# Patient Record
Sex: FEMALE | Race: WHITE | NOT HISPANIC OR LATINO
[De-identification: names, ages, dates, MRNs, and addresses within clinical notes are randomized per-mention and may not be internally consistent; named-entity substitution may affect disease eponyms.]

---

## 2017-11-17 ENCOUNTER — TRANSCRIPTION ENCOUNTER (OUTPATIENT)
Age: 22
End: 2017-11-17

## 2017-12-30 ENCOUNTER — TRANSCRIPTION ENCOUNTER (OUTPATIENT)
Age: 22
End: 2017-12-30

## 2018-03-05 ENCOUNTER — APPOINTMENT (OUTPATIENT)
Dept: RHEUMATOLOGY | Facility: CLINIC | Age: 23
End: 2018-03-05

## 2018-03-06 ENCOUNTER — TRANSCRIPTION ENCOUNTER (OUTPATIENT)
Age: 23
End: 2018-03-06

## 2018-04-11 ENCOUNTER — TRANSCRIPTION ENCOUNTER (OUTPATIENT)
Age: 23
End: 2018-04-11

## 2018-07-05 ENCOUNTER — TRANSCRIPTION ENCOUNTER (OUTPATIENT)
Age: 23
End: 2018-07-05

## 2018-07-13 ENCOUNTER — APPOINTMENT (OUTPATIENT)
Dept: FAMILY MEDICINE | Facility: CLINIC | Age: 23
End: 2018-07-13
Payer: COMMERCIAL

## 2018-07-13 ENCOUNTER — NON-APPOINTMENT (OUTPATIENT)
Age: 23
End: 2018-07-13

## 2018-07-13 VITALS
DIASTOLIC BLOOD PRESSURE: 78 MMHG | TEMPERATURE: 98 F | HEART RATE: 88 BPM | RESPIRATION RATE: 14 BRPM | BODY MASS INDEX: 24.14 KG/M2 | SYSTOLIC BLOOD PRESSURE: 120 MMHG | WEIGHT: 115 LBS | OXYGEN SATURATION: 98 % | HEIGHT: 58 IN

## 2018-07-13 DIAGNOSIS — Z84.1 FAMILY HISTORY OF DISORDERS OF KIDNEY AND URETER: ICD-10-CM

## 2018-07-13 PROCEDURE — 99385 PREV VISIT NEW AGE 18-39: CPT | Mod: 25

## 2018-07-13 PROCEDURE — 86580 TB INTRADERMAL TEST: CPT

## 2018-07-13 PROCEDURE — 93000 ELECTROCARDIOGRAM COMPLETE: CPT

## 2018-08-16 ENCOUNTER — APPOINTMENT (OUTPATIENT)
Dept: OPHTHALMOLOGY | Facility: CLINIC | Age: 23
End: 2018-08-16
Payer: COMMERCIAL

## 2018-08-16 PROCEDURE — 92060 SENSORIMOTOR EXAMINATION: CPT

## 2018-08-16 PROCEDURE — 92014 COMPRE OPH EXAM EST PT 1/>: CPT

## 2018-08-16 PROCEDURE — 92083 EXTENDED VISUAL FIELD XM: CPT

## 2018-12-01 ENCOUNTER — TRANSCRIPTION ENCOUNTER (OUTPATIENT)
Age: 23
End: 2018-12-01

## 2019-01-03 ENCOUNTER — APPOINTMENT (OUTPATIENT)
Dept: FAMILY MEDICINE | Facility: CLINIC | Age: 24
End: 2019-01-03

## 2019-04-22 ENCOUNTER — APPOINTMENT (OUTPATIENT)
Dept: FAMILY MEDICINE | Facility: CLINIC | Age: 24
End: 2019-04-22

## 2019-05-09 ENCOUNTER — APPOINTMENT (OUTPATIENT)
Dept: PHYSICAL MEDICINE AND REHAB | Facility: CLINIC | Age: 24
End: 2019-05-09
Payer: COMMERCIAL

## 2019-05-09 VITALS
OXYGEN SATURATION: 100 % | SYSTOLIC BLOOD PRESSURE: 154 MMHG | TEMPERATURE: 98 F | HEART RATE: 81 BPM | DIASTOLIC BLOOD PRESSURE: 93 MMHG

## 2019-05-09 PROCEDURE — 99204 OFFICE O/P NEW MOD 45 MIN: CPT

## 2019-05-15 NOTE — PHYSICAL EXAM
[FreeTextEntry1] : Constitutional: Alert, awake, no acute distress\par HEENT: EOMI, NC/AT, neck supple\par Cardiovascular: All extremities warm and well perfused\par Pulmonary: No respiratory distress, normal chest wall expansion\par Gastrointestinal: No abdominal distention\par \par Neuro: \par AAOx3, somewhat anxious\par \par Recall 4/5 spontaneously., 5/5 with cues \par Attention--able to complete MOYB, reverse digit span and serial 7's\par Executive function--difficulty with Trails B and clock drawing. \par \par CN: intact no nystagmus, visual fields intact, PERRLA, EOMI, no facial weakness or sensory deficit, shoulder georgina intact, tongue midline\par VOR testing--no nystagmus\par near far accommodation--tolerates while wearing prisms\par focusing on stationary thumb with head rotation in horizontal or vertical plane--tolerates while wearing prisms\par Motor 5/5 bilat UE and LE\par Sens intact bilat UE and LE\par Coordination intact--FNF and HTS intact\par Gait normal\par Balance: able to perform tandem gait \par \par \par \par \par

## 2019-05-15 NOTE — ASSESSMENT
[FreeTextEntry1] : Pt. and her mother counselled at length on symptoms and pathology of concussion, recovery course and  prognosis. \par At this time, 9 months from concussion it is unlikely that pt's migraines and current cognitive deficits related to prior concussion, especially since migraines developed recently.  Pt. with insomnia which can cause cognitive symptoms and irritability.  Counselled on proper sleep hygiene techniques and recommended trial of melatonin. \par \par Recommended neurology evaluation for migraine evaluation--referral given.  \par \par Also recommend considering endocrine workup with pt's endocrinologist as hormonal changes can cause migraines and mood changes. \par \par Pt's visual deficits are a direct result of the concussion but these appear to be improving significantly with vision therapy and prism glasses.  Pt. advised to cont. to f/u with neurooptometry.\par \par All questions answered.\par \par \par

## 2019-05-15 NOTE — REVIEW OF SYSTEMS
[Fatigue] : fatigue [Vision Problems] : vision problems [Headache] : headache [Insomnia] : insomnia [Negative] : Heme/Lymph
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
## 2019-05-21 ENCOUNTER — TRANSCRIPTION ENCOUNTER (OUTPATIENT)
Age: 24
End: 2019-05-21

## 2019-05-23 ENCOUNTER — APPOINTMENT (OUTPATIENT)
Dept: ORTHOPEDIC SURGERY | Facility: CLINIC | Age: 24
End: 2019-05-23
Payer: COMMERCIAL

## 2019-05-23 VITALS
SYSTOLIC BLOOD PRESSURE: 135 MMHG | WEIGHT: 120 LBS | HEART RATE: 82 BPM | DIASTOLIC BLOOD PRESSURE: 88 MMHG | HEIGHT: 58 IN | BODY MASS INDEX: 25.19 KG/M2

## 2019-05-23 PROCEDURE — 73502 X-RAY EXAM HIP UNI 2-3 VIEWS: CPT | Mod: LT

## 2019-05-23 PROCEDURE — 99204 OFFICE O/P NEW MOD 45 MIN: CPT

## 2019-05-24 ENCOUNTER — APPOINTMENT (OUTPATIENT)
Dept: MRI IMAGING | Facility: HOSPITAL | Age: 24
End: 2019-05-24
Payer: COMMERCIAL

## 2019-05-24 ENCOUNTER — OUTPATIENT (OUTPATIENT)
Dept: OUTPATIENT SERVICES | Facility: HOSPITAL | Age: 24
LOS: 1 days | End: 2019-05-24
Payer: COMMERCIAL

## 2019-05-24 DIAGNOSIS — M25.552 PAIN IN LEFT HIP: ICD-10-CM

## 2019-05-24 PROCEDURE — 73721 MRI JNT OF LWR EXTRE W/O DYE: CPT

## 2019-05-24 PROCEDURE — 73721 MRI JNT OF LWR EXTRE W/O DYE: CPT | Mod: 26,LT

## 2019-05-25 ENCOUNTER — APPOINTMENT (OUTPATIENT)
Dept: MRI IMAGING | Facility: HOSPITAL | Age: 24
End: 2019-05-25
Payer: COMMERCIAL

## 2019-05-30 ENCOUNTER — APPOINTMENT (OUTPATIENT)
Dept: ORTHOPEDIC SURGERY | Facility: CLINIC | Age: 24
End: 2019-05-30
Payer: COMMERCIAL

## 2019-05-30 VITALS
DIASTOLIC BLOOD PRESSURE: 93 MMHG | SYSTOLIC BLOOD PRESSURE: 147 MMHG | WEIGHT: 120 LBS | HEIGHT: 58 IN | HEART RATE: 98 BPM | BODY MASS INDEX: 25.19 KG/M2

## 2019-05-30 PROCEDURE — 99213 OFFICE O/P EST LOW 20 MIN: CPT

## 2019-06-03 ENCOUNTER — APPOINTMENT (OUTPATIENT)
Dept: FAMILY MEDICINE | Facility: CLINIC | Age: 24
End: 2019-06-03
Payer: COMMERCIAL

## 2019-06-03 VITALS — SYSTOLIC BLOOD PRESSURE: 122 MMHG | TEMPERATURE: 98.3 F | DIASTOLIC BLOOD PRESSURE: 80 MMHG

## 2019-06-03 VITALS — HEART RATE: 77 BPM

## 2019-06-03 PROCEDURE — 36415 COLL VENOUS BLD VENIPUNCTURE: CPT

## 2019-06-03 PROCEDURE — 99213 OFFICE O/P EST LOW 20 MIN: CPT | Mod: 25

## 2019-06-04 LAB
ANION GAP SERPL CALC-SCNC: 11 MMOL/L
BASOPHILS # BLD AUTO: 0.07 K/UL
BASOPHILS NFR BLD AUTO: 0.8 %
BUN SERPL-MCNC: 8 MG/DL
CALCIUM SERPL-MCNC: 9.7 MG/DL
CHLORIDE SERPL-SCNC: 102 MMOL/L
CO2 SERPL-SCNC: 26 MMOL/L
CREAT SERPL-MCNC: 0.79 MG/DL
EOSINOPHIL # BLD AUTO: 0.1 K/UL
EOSINOPHIL NFR BLD AUTO: 1.2 %
GLUCOSE SERPL-MCNC: 102 MG/DL
HCT VFR BLD CALC: 42.7 %
HGB BLD-MCNC: 13.8 G/DL
IMM GRANULOCYTES NFR BLD AUTO: 0.2 %
LYMPHOCYTES # BLD AUTO: 2.21 K/UL
LYMPHOCYTES NFR BLD AUTO: 25.8 %
MAGNESIUM SERPL-MCNC: 2 MG/DL
MAN DIFF?: NORMAL
MCHC RBC-ENTMCNC: 27.5 PG
MCHC RBC-ENTMCNC: 32.3 GM/DL
MCV RBC AUTO: 85.2 FL
MONOCYTES # BLD AUTO: 0.69 K/UL
MONOCYTES NFR BLD AUTO: 8.1 %
NEUTROPHILS # BLD AUTO: 5.48 K/UL
NEUTROPHILS NFR BLD AUTO: 63.9 %
PHOSPHATE SERPL-MCNC: 3.6 MG/DL
PLATELET # BLD AUTO: 352 K/UL
POTASSIUM SERPL-SCNC: 4.8 MMOL/L
RBC # BLD: 5.01 M/UL
RBC # FLD: 13.3 %
SODIUM SERPL-SCNC: 139 MMOL/L
T3 SERPL-MCNC: 117 NG/DL
T4 SERPL-MCNC: 8.5 UG/DL
TSH SERPL-ACNC: 1.67 UIU/ML
WBC # FLD AUTO: 8.57 K/UL

## 2019-06-05 ENCOUNTER — APPOINTMENT (OUTPATIENT)
Dept: CARDIOLOGY | Facility: CLINIC | Age: 24
End: 2019-06-05

## 2019-06-05 ENCOUNTER — APPOINTMENT (OUTPATIENT)
Dept: PULMONOLOGY | Facility: CLINIC | Age: 24
End: 2019-06-05
Payer: COMMERCIAL

## 2019-06-05 VITALS
OXYGEN SATURATION: 97 % | WEIGHT: 116 LBS | BODY MASS INDEX: 24.35 KG/M2 | HEIGHT: 58 IN | HEART RATE: 116 BPM | SYSTOLIC BLOOD PRESSURE: 113 MMHG | DIASTOLIC BLOOD PRESSURE: 70 MMHG | RESPIRATION RATE: 15 BRPM

## 2019-06-05 DIAGNOSIS — H10.33 UNSPECIFIED ACUTE CONJUNCTIVITIS, BILATERAL: ICD-10-CM

## 2019-06-05 DIAGNOSIS — Z87.42 PERSONAL HISTORY OF OTHER DISEASES OF THE FEMALE GENITAL TRACT: ICD-10-CM

## 2019-06-05 DIAGNOSIS — Z86.19 PERSONAL HISTORY OF OTHER INFECTIOUS AND PARASITIC DISEASES: ICD-10-CM

## 2019-06-05 PROCEDURE — 94727 GAS DIL/WSHOT DETER LNG VOL: CPT

## 2019-06-05 PROCEDURE — 99204 OFFICE O/P NEW MOD 45 MIN: CPT | Mod: 25

## 2019-06-05 PROCEDURE — 94060 EVALUATION OF WHEEZING: CPT

## 2019-06-05 PROCEDURE — 94729 DIFFUSING CAPACITY: CPT

## 2019-06-05 NOTE — REVIEW OF SYSTEMS
[Nasal Congestion] : nasal congestion [Postnasal Drip] : postnasal drip [Chest Tightness] : chest tightness [Dyspnea] : dyspnea [As Noted in HPI] : as noted in HPI [Palpitations] : palpitations [Itchy Eyes] : itching of ~T the eyes [Nasal Discharge] : nasal discharge [Negative] : Pulmonary Hypertension

## 2019-06-05 NOTE — PHYSICAL EXAM
[General Appearance - Well Nourished] : well nourished [General Appearance - Well Developed] : well developed [General Appearance - In No Acute Distress] : no acute distress [Normal Conjunctiva] : the conjunctiva exhibited no abnormalities [Erythema] : erythema of the pharynx [] : the neck was supple [Jugular Venous Distention Increased] : there was no jugular-venous distention [Heart Sounds] : normal S1 and S2 [Murmurs] : no murmurs present [Respiration, Rhythm And Depth] : normal respiratory rhythm and effort [Abdomen Soft] : soft [Auscultation Breath Sounds / Voice Sounds] : lungs were clear to auscultation bilaterally [Nail Clubbing] : no clubbing of the fingernails [Cyanosis, Localized] : no localized cyanosis [Non-Pitting] : non-pitting [Cranial Nerves] : cranial nerves 2-12 were intact [Oriented To Time, Place, And Person] : oriented to person, place, and time

## 2019-06-05 NOTE — PROCEDURE
[FreeTextEntry1] : PFT 6/5/2019 personally reviewed minimal obstructive ventilatory defect without gas exchange abnormality and insignificant bronchodilator response.

## 2019-06-05 NOTE — ASSESSMENT
[FreeTextEntry1] : 24 year old female Hx asthma, allergies presents for evaluation\par \par Initiate Asmanex 100 mcg 2 puffs twice daily \par Flonase Sensimist as directed\par Azelastine 2 sprays each nostril daily\par Hypersensitivity, Asthma, Food, IgE panel\par CBC with differential\par \par Follow up 4-6 weeks

## 2019-06-05 NOTE — HISTORY OF PRESENT ILLNESS
[FreeTextEntry1] : Patient is a 24 year old female Hx asthma, allergies presents for evaluation.  Patient reports she has been on Breo in the past.  Patient stopped taking Breo because she felt it aggravated palpitations she was experiencing.  Patient reports she does experiences environmental allergies especially in Spring and Fall. She reports increased chest tightness recently.  She has been using rescue inhaler more frequently.  She is here for these symptoms.

## 2019-06-06 ENCOUNTER — APPOINTMENT (OUTPATIENT)
Dept: CARDIOLOGY | Facility: CLINIC | Age: 24
End: 2019-06-06
Payer: COMMERCIAL

## 2019-06-06 VITALS
SYSTOLIC BLOOD PRESSURE: 118 MMHG | WEIGHT: 116 LBS | BODY MASS INDEX: 24.35 KG/M2 | RESPIRATION RATE: 16 BRPM | HEART RATE: 99 BPM | DIASTOLIC BLOOD PRESSURE: 78 MMHG | HEIGHT: 58 IN

## 2019-06-06 PROCEDURE — 93000 ELECTROCARDIOGRAM COMPLETE: CPT

## 2019-06-06 PROCEDURE — 99202 OFFICE O/P NEW SF 15 MIN: CPT

## 2019-06-07 ENCOUNTER — APPOINTMENT (OUTPATIENT)
Dept: CARDIOLOGY | Facility: CLINIC | Age: 24
End: 2019-06-07
Payer: COMMERCIAL

## 2019-06-07 PROCEDURE — 93306 TTE W/DOPPLER COMPLETE: CPT

## 2019-06-12 ENCOUNTER — APPOINTMENT (OUTPATIENT)
Dept: CARDIOLOGY | Facility: CLINIC | Age: 24
End: 2019-06-12
Payer: COMMERCIAL

## 2019-06-12 PROCEDURE — 93224 XTRNL ECG REC UP TO 48 HRS: CPT

## 2019-06-20 ENCOUNTER — NON-APPOINTMENT (OUTPATIENT)
Age: 24
End: 2019-06-20

## 2019-06-28 ENCOUNTER — APPOINTMENT (OUTPATIENT)
Dept: ORTHOPEDIC SURGERY | Facility: CLINIC | Age: 24
End: 2019-06-28

## 2019-07-25 ENCOUNTER — APPOINTMENT (OUTPATIENT)
Dept: PULMONOLOGY | Facility: CLINIC | Age: 24
End: 2019-07-25

## 2019-07-30 ENCOUNTER — APPOINTMENT (OUTPATIENT)
Dept: ORTHOPEDIC SURGERY | Facility: CLINIC | Age: 24
End: 2019-07-30
Payer: COMMERCIAL

## 2019-07-30 VITALS
BODY MASS INDEX: 24.35 KG/M2 | HEIGHT: 58 IN | SYSTOLIC BLOOD PRESSURE: 145 MMHG | WEIGHT: 116 LBS | DIASTOLIC BLOOD PRESSURE: 88 MMHG | HEART RATE: 87 BPM

## 2019-07-30 PROCEDURE — 99213 OFFICE O/P EST LOW 20 MIN: CPT

## 2019-08-07 ENCOUNTER — FORM ENCOUNTER (OUTPATIENT)
Age: 24
End: 2019-08-07

## 2019-08-08 ENCOUNTER — APPOINTMENT (OUTPATIENT)
Dept: RADIOLOGY | Facility: CLINIC | Age: 24
End: 2019-08-08
Payer: COMMERCIAL

## 2019-08-08 ENCOUNTER — OUTPATIENT (OUTPATIENT)
Dept: OUTPATIENT SERVICES | Facility: HOSPITAL | Age: 24
LOS: 1 days | End: 2019-08-08
Payer: COMMERCIAL

## 2019-08-08 DIAGNOSIS — M25.552 PAIN IN LEFT HIP: ICD-10-CM

## 2019-08-08 PROCEDURE — 73525 CONTRAST X-RAY OF HIP: CPT | Mod: 26,LT

## 2019-08-08 PROCEDURE — 73525 CONTRAST X-RAY OF HIP: CPT

## 2019-08-08 PROCEDURE — 27093 INJECTION FOR HIP X-RAY: CPT | Mod: LT

## 2019-08-08 PROCEDURE — 27093 INJECTION FOR HIP X-RAY: CPT

## 2019-08-12 ENCOUNTER — APPOINTMENT (OUTPATIENT)
Dept: OBGYN | Facility: CLINIC | Age: 24
End: 2019-08-12

## 2019-08-13 ENCOUNTER — APPOINTMENT (OUTPATIENT)
Dept: PULMONOLOGY | Facility: CLINIC | Age: 24
End: 2019-08-13
Payer: COMMERCIAL

## 2019-08-13 VITALS
SYSTOLIC BLOOD PRESSURE: 140 MMHG | BODY MASS INDEX: 24.77 KG/M2 | DIASTOLIC BLOOD PRESSURE: 80 MMHG | HEART RATE: 66 BPM | OXYGEN SATURATION: 99 % | RESPIRATION RATE: 16 BRPM | HEIGHT: 58 IN | WEIGHT: 118 LBS

## 2019-08-13 PROCEDURE — 99214 OFFICE O/P EST MOD 30 MIN: CPT

## 2019-08-13 RX ORDER — BUDESONIDE 90 UG/1
90 AEROSOL, POWDER RESPIRATORY (INHALATION)
Qty: 1 | Refills: 0 | Status: DISCONTINUED | COMMUNITY
Start: 2019-07-08 | End: 2019-08-13

## 2019-08-13 RX ORDER — FLUTICASONE FUROATE AND VILANTEROL TRIFENATATE 200; 25 UG/1; UG/1
200-25 POWDER RESPIRATORY (INHALATION)
Qty: 60 | Refills: 0 | Status: DISCONTINUED | COMMUNITY
Start: 2017-11-21 | End: 2019-08-13

## 2019-08-13 NOTE — HISTORY OF PRESENT ILLNESS
[FreeTextEntry1] : Patient is a 24 year old female Hx asthma, allergies presents for follow up. Patient was started on Asmanex  for control  mild persistent asthma.  She is here for follow up.

## 2019-08-13 NOTE — ASSESSMENT
[FreeTextEntry1] : 24 year old female Hx asthma, allergies presents for evaluation\par \par Continue Asmanex 100 mcg 2 puffs twice daily \par Flonase Sensimist as directed\par Azelastine 2 sprays each nostril daily\par Hypersensitivity panel, Food allergy panel, CBC with Diff, IgE sent \par \par Follow up after laboratory studies

## 2019-08-13 NOTE — REVIEW OF SYSTEMS
[Nasal Congestion] : nasal congestion [Postnasal Drip] : postnasal drip [Dyspnea] : dyspnea [Chest Tightness] : chest tightness [As Noted in HPI] : as noted in HPI [Palpitations] : palpitations [Itchy Eyes] : itching of ~T the eyes [Nasal Discharge] : nasal discharge [Negative] : Sleep Disorder

## 2019-08-13 NOTE — PHYSICAL EXAM
[General Appearance - Well Developed] : well developed [General Appearance - Well Nourished] : well nourished [General Appearance - In No Acute Distress] : no acute distress [Erythema] : erythema of the pharynx [Normal Conjunctiva] : the conjunctiva exhibited no abnormalities [] : the neck was supple [Jugular Venous Distention Increased] : there was no jugular-venous distention [Heart Sounds] : normal S1 and S2 [Murmurs] : no murmurs present [Auscultation Breath Sounds / Voice Sounds] : lungs were clear to auscultation bilaterally [Respiration, Rhythm And Depth] : normal respiratory rhythm and effort [Abdomen Soft] : soft [Nail Clubbing] : no clubbing of the fingernails [Cyanosis, Localized] : no localized cyanosis [Non-Pitting] : non-pitting [Cranial Nerves] : cranial nerves 2-12 were intact [Oriented To Time, Place, And Person] : oriented to person, place, and time

## 2019-08-15 ENCOUNTER — APPOINTMENT (OUTPATIENT)
Dept: FAMILY MEDICINE | Facility: CLINIC | Age: 24
End: 2019-08-15
Payer: COMMERCIAL

## 2019-08-15 VITALS
HEART RATE: 83 BPM | HEIGHT: 58 IN | RESPIRATION RATE: 17 BRPM | WEIGHT: 118 LBS | TEMPERATURE: 98.1 F | DIASTOLIC BLOOD PRESSURE: 82 MMHG | BODY MASS INDEX: 24.77 KG/M2 | OXYGEN SATURATION: 99 % | SYSTOLIC BLOOD PRESSURE: 132 MMHG

## 2019-08-15 PROCEDURE — 99395 PREV VISIT EST AGE 18-39: CPT

## 2019-08-19 ENCOUNTER — APPOINTMENT (OUTPATIENT)
Dept: FAMILY MEDICINE | Facility: CLINIC | Age: 24
End: 2019-08-19
Payer: COMMERCIAL

## 2019-08-19 PROCEDURE — 86580 TB INTRADERMAL TEST: CPT

## 2019-08-31 ENCOUNTER — LABORATORY RESULT (OUTPATIENT)
Age: 24
End: 2019-08-31

## 2019-08-31 LAB
ALBUMIN SERPL ELPH-MCNC: 4.6 G/DL
ALP BLD-CCNC: 45 U/L
ALT SERPL-CCNC: 14 U/L
ANION GAP SERPL CALC-SCNC: 13 MMOL/L
AST SERPL-CCNC: 12 U/L
BASOPHILS # BLD AUTO: 0.08 K/UL
BASOPHILS NFR BLD AUTO: 0.7 %
BILIRUB SERPL-MCNC: 0.6 MG/DL
BUN SERPL-MCNC: 8 MG/DL
CALCIUM SERPL-MCNC: 9.5 MG/DL
CHLORIDE SERPL-SCNC: 103 MMOL/L
CO2 SERPL-SCNC: 24 MMOL/L
CREAT SERPL-MCNC: 0.75 MG/DL
DEPRECATED KAPPA LC FREE/LAMBDA SER: 1.17 RATIO
EOSINOPHIL # BLD AUTO: 0.15 K/UL
EOSINOPHIL NFR BLD AUTO: 1.3 %
GLUCOSE SERPL-MCNC: 82 MG/DL
HCT VFR BLD CALC: 39.7 %
HGB BLD-MCNC: 12.8 G/DL
IGA SER QL IEP: 134 MG/DL
IGG SER QL IEP: 794 MG/DL
IGM SER QL IEP: 85 MG/DL
IMM GRANULOCYTES NFR BLD AUTO: 0.4 %
KAPPA LC CSF-MCNC: 1.39 MG/DL
KAPPA LC SERPL-MCNC: 1.62 MG/DL
LYMPHOCYTES # BLD AUTO: 2.12 K/UL
LYMPHOCYTES NFR BLD AUTO: 18.9 %
MAN DIFF?: NORMAL
MCHC RBC-ENTMCNC: 26.9 PG
MCHC RBC-ENTMCNC: 32.2 GM/DL
MCV RBC AUTO: 83.6 FL
MONOCYTES # BLD AUTO: 0.7 K/UL
MONOCYTES NFR BLD AUTO: 6.3 %
NEUTROPHILS # BLD AUTO: 8.09 K/UL
NEUTROPHILS NFR BLD AUTO: 72.4 %
PLATELET # BLD AUTO: 299 K/UL
POTASSIUM SERPL-SCNC: 4.2 MMOL/L
PROT SERPL-MCNC: 6.7 G/DL
RBC # BLD: 4.75 M/UL
RBC # FLD: 13.7 %
SODIUM SERPL-SCNC: 140 MMOL/L
WBC # FLD AUTO: 11.19 K/UL

## 2019-09-04 LAB
25(OH)D3 SERPL-MCNC: 30.3 NG/ML
ALBUMIN SERPL ELPH-MCNC: 4.5 G/DL
ALP BLD-CCNC: 45 U/L
ALT SERPL-CCNC: 14 U/L
ALTERN TENCAPG(M6): <2 MCG/ML
ANION GAP SERPL CALC-SCNC: 14 MMOL/L
APPEARANCE: CLEAR
ASPER FUMCAPG(M3): 14 MCG/ML
AST SERPL-CCNC: 14 U/L
AUREOBASCAPG(M12): <2 MCG/ML
BACTERIA: NEGATIVE
BASOPHILS # BLD AUTO: 0.09 K/UL
BASOPHILS NFR BLD AUTO: 0.8 %
BILIRUB SERPL-MCNC: 0.6 MG/DL
BILIRUBIN URINE: NEGATIVE
BLOOD URINE: NEGATIVE
BUN SERPL-MCNC: 8 MG/DL
CALCIUM SERPL-MCNC: 9.4 MG/DL
CHLORIDE SERPL-SCNC: 102 MMOL/L
CHOLEST SERPL-MCNC: 197 MG/DL
CHOLEST/HDLC SERPL: 2 RATIO
CO2 SERPL-SCNC: 24 MMOL/L
COLOR: NORMAL
CREAT SERPL-MCNC: 0.77 MG/DL
EOSINOPHIL # BLD AUTO: 0.15 K/UL
EOSINOPHIL NFR BLD AUTO: 1.4 %
ESTIMATED AVERAGE GLUCOSE: 105 MG/DL
FERRITIN SERPL-MCNC: 19 NG/ML
FOLATE SERPL-MCNC: 12.7 NG/ML
GLUCOSE QUALITATIVE U: NEGATIVE
GLUCOSE SERPL-MCNC: 82 MG/DL
HBA1C MFR BLD HPLC: 5.3 %
HCT VFR BLD CALC: 39.6 %
HDLC SERPL-MCNC: 100 MG/DL
HGB BLD-MCNC: 12.9 G/DL
HYALINE CASTS: 0 /LPF
IMM GRANULOCYTES NFR BLD AUTO: 0.4 %
IRON SATN MFR SERPL: 27 %
IRON SERPL-MCNC: 93 UG/DL
IRON SERPL-MCNC: 95 UG/DL
KETONES URINE: NEGATIVE
LDLC SERPL CALC-MCNC: 75 MG/DL
LEUKOCYTE ESTERASE URINE: NEGATIVE
LYMPHOCYTES # BLD AUTO: 2.22 K/UL
LYMPHOCYTES NFR BLD AUTO: 20.2 %
MAN DIFF?: NORMAL
MCHC RBC-ENTMCNC: 27.2 PG
MCHC RBC-ENTMCNC: 32.6 GM/DL
MCV RBC AUTO: 83.4 FL
MICROPOLYCAPG(M22): <2 MCG/ML
MICROSCOPIC-UA: NORMAL
MONOCYTES # BLD AUTO: 0.71 K/UL
MONOCYTES NFR BLD AUTO: 6.5 %
NEUTROPHILS # BLD AUTO: 7.77 K/UL
NEUTROPHILS NFR BLD AUTO: 70.7 %
NITRITE URINE: NEGATIVE
PENIC CHRYCAPG(M1): 10.7 MCG/ML
PH URINE: 8
PHOMA BETAE IGG: 3.1 MCG/ML
PLATELET # BLD AUTO: 301 K/UL
POTASSIUM SERPL-SCNC: 4.4 MMOL/L
PROT SERPL-MCNC: 6.8 G/DL
PROTEIN URINE: NEGATIVE
RBC # BLD: 4.75 M/UL
RBC # FLD: 13.8 %
RED BLOOD CELLS URINE: 1 /HPF
SODIUM SERPL-SCNC: 140 MMOL/L
SPECIFIC GRAVITY URINE: 1.01
SQUAMOUS EPITHELIAL CELLS: 5 /HPF
THERMOCAPG(M23): 2.6 MCG/ML
TIBC SERPL-MCNC: 356 UG/DL
TRICHODERMA VIRIDE IGG: 2.1 MCG/ML
TRIGL SERPL-MCNC: 111 MG/DL
TSH SERPL-ACNC: 1.3 UIU/ML
UIBC SERPL-MCNC: 261 UG/DL
UROBILINOGEN URINE: NORMAL
VIT B12 SERPL-MCNC: 518 PG/ML
WBC # FLD AUTO: 10.98 K/UL
WHITE BLOOD CELLS URINE: 1 /HPF

## 2019-09-06 LAB
A ALTERNATA IGE QN: <0.1 KUA/L
A FUMIGATUS IGE QN: <0.1 KUA/L
C ALBICANS IGE QN: <0.1 KUA/L
C HERBARUM IGE QN: <0.1 KUA/L
CAT DANDER IGE QN: <0.1 KUA/L
COMMON RAGWEED IGE QN: <0.1 KUA/L
D FARINAE IGE QN: <0.1 KUA/L
D PTERONYSS IGE QN: <0.1 KUA/L
DEPRECATED A ALTERNATA IGE RAST QL: 0
DEPRECATED A FUMIGATUS IGE RAST QL: 0
DEPRECATED C ALBICANS IGE RAST QL: 0
DEPRECATED C HERBARUM IGE RAST QL: 0
DEPRECATED CAT DANDER IGE RAST QL: 0
DEPRECATED COMMON RAGWEED IGE RAST QL: 0
DEPRECATED D FARINAE IGE RAST QL: 0
DEPRECATED D PTERONYSS IGE RAST QL: 0
DEPRECATED DOG DANDER IGE RAST QL: 0
DEPRECATED M RACEMOSUS IGE RAST QL: 0
DEPRECATED ROACH IGE RAST QL: 0
DEPRECATED TIMOTHY IGE RAST QL: 0
DEPRECATED WHITE OAK IGE RAST QL: 0
DOG DANDER IGE QN: <0.1 KUA/L
M RACEMOSUS IGE QN: <0.1 KUA/L
ROACH IGE QN: <0.1 KUA/L
TIMOTHY IGE QN: <0.1 KUA/L
TOTAL IGE SMQN RAST: 21 KU/L
WHITE OAK IGE QN: <0.1 KUA/L

## 2019-09-18 ENCOUNTER — APPOINTMENT (OUTPATIENT)
Dept: PULMONOLOGY | Facility: CLINIC | Age: 24
End: 2019-09-18

## 2019-09-23 ENCOUNTER — APPOINTMENT (OUTPATIENT)
Dept: FAMILY MEDICINE | Facility: CLINIC | Age: 24
End: 2019-09-23

## 2019-10-03 ENCOUNTER — APPOINTMENT (OUTPATIENT)
Dept: ULTRASOUND IMAGING | Facility: CLINIC | Age: 24
End: 2019-10-03
Payer: COMMERCIAL

## 2019-10-03 ENCOUNTER — OUTPATIENT (OUTPATIENT)
Dept: OUTPATIENT SERVICES | Facility: HOSPITAL | Age: 24
LOS: 1 days | End: 2019-10-03
Payer: COMMERCIAL

## 2019-10-03 DIAGNOSIS — N23 UNSPECIFIED RENAL COLIC: ICD-10-CM

## 2019-10-03 PROCEDURE — 76770 US EXAM ABDO BACK WALL COMP: CPT

## 2019-10-03 PROCEDURE — 76770 US EXAM ABDO BACK WALL COMP: CPT | Mod: 26

## 2019-10-04 ENCOUNTER — TRANSCRIPTION ENCOUNTER (OUTPATIENT)
Age: 24
End: 2019-10-04

## 2019-10-07 ENCOUNTER — APPOINTMENT (OUTPATIENT)
Dept: FAMILY MEDICINE | Facility: CLINIC | Age: 24
End: 2019-10-07
Payer: COMMERCIAL

## 2019-10-07 VITALS — SYSTOLIC BLOOD PRESSURE: 98 MMHG | TEMPERATURE: 99.1 F | DIASTOLIC BLOOD PRESSURE: 62 MMHG

## 2019-10-07 LAB
FLUAV SPEC QL CULT: NEGATIVE
FLUBV AG SPEC QL IA: NEGATIVE

## 2019-10-07 PROCEDURE — 99214 OFFICE O/P EST MOD 30 MIN: CPT | Mod: 25

## 2019-10-07 PROCEDURE — 87804 INFLUENZA ASSAY W/OPTIC: CPT | Mod: QW

## 2019-10-10 ENCOUNTER — APPOINTMENT (OUTPATIENT)
Dept: PULMONOLOGY | Facility: CLINIC | Age: 24
End: 2019-10-10
Payer: COMMERCIAL

## 2019-10-10 VITALS
SYSTOLIC BLOOD PRESSURE: 120 MMHG | BODY MASS INDEX: 24.98 KG/M2 | WEIGHT: 119 LBS | RESPIRATION RATE: 17 BRPM | DIASTOLIC BLOOD PRESSURE: 80 MMHG | HEIGHT: 58 IN | OXYGEN SATURATION: 98 % | HEART RATE: 92 BPM

## 2019-10-10 PROCEDURE — 99214 OFFICE O/P EST MOD 30 MIN: CPT

## 2019-10-10 NOTE — HISTORY OF PRESENT ILLNESS
[FreeTextEntry1] : Patient is a 24 year old female Hx asthma, allergies presents for follow up. Patient was started on Asmanex  for control  mild persistent asthma.  She had asthma/hypersensitivity workup which was unremarkable.  She did experience URI with fever and chills however now feeling better.  She is completing course of Doxycycline.  She is here for follow up.

## 2019-10-10 NOTE — ASSESSMENT
[FreeTextEntry1] : 24 year old female Hx asthma, allergies presents for evaluation\par \par Continue Asmanex 220 mcg 2 puffs twice daily \par Flonase Sensimist as directed\par Azelastine 2 sprays each nostril daily\par \par Follow up 6 months \par

## 2019-10-10 NOTE — PHYSICAL EXAM
[General Appearance - Well Developed] : well developed [General Appearance - Well Nourished] : well nourished [General Appearance - In No Acute Distress] : no acute distress [Normal Conjunctiva] : the conjunctiva exhibited no abnormalities [Erythema] : erythema of the pharynx [] : the neck was supple [Jugular Venous Distention Increased] : there was no jugular-venous distention [Heart Sounds] : normal S1 and S2 [Murmurs] : no murmurs present [Respiration, Rhythm And Depth] : normal respiratory rhythm and effort [Auscultation Breath Sounds / Voice Sounds] : lungs were clear to auscultation bilaterally [Abdomen Soft] : soft [Nail Clubbing] : no clubbing of the fingernails [Cyanosis, Localized] : no localized cyanosis [Non-Pitting] : non-pitting [Skin Color & Pigmentation] : normal skin color and pigmentation [Cranial Nerves] : cranial nerves 2-12 were intact [Oriented To Time, Place, And Person] : oriented to person, place, and time

## 2019-10-11 ENCOUNTER — APPOINTMENT (OUTPATIENT)
Dept: FAMILY MEDICINE | Facility: CLINIC | Age: 24
End: 2019-10-11
Payer: COMMERCIAL

## 2019-10-11 VITALS — DIASTOLIC BLOOD PRESSURE: 60 MMHG | SYSTOLIC BLOOD PRESSURE: 90 MMHG | TEMPERATURE: 98.3 F

## 2019-10-11 PROCEDURE — 99214 OFFICE O/P EST MOD 30 MIN: CPT

## 2019-10-11 RX ORDER — CEFUROXIME AXETIL 500 MG/1
500 TABLET ORAL
Qty: 20 | Refills: 0 | Status: DISCONTINUED | COMMUNITY
Start: 2019-10-07 | End: 2019-10-11

## 2020-01-02 ENCOUNTER — APPOINTMENT (OUTPATIENT)
Dept: FAMILY MEDICINE | Facility: CLINIC | Age: 25
End: 2020-01-02
Payer: COMMERCIAL

## 2020-01-03 ENCOUNTER — APPOINTMENT (OUTPATIENT)
Dept: FAMILY MEDICINE | Facility: CLINIC | Age: 25
End: 2020-01-03
Payer: COMMERCIAL

## 2020-01-03 VITALS — TEMPERATURE: 98.2 F | DIASTOLIC BLOOD PRESSURE: 58 MMHG | SYSTOLIC BLOOD PRESSURE: 90 MMHG

## 2020-01-03 PROCEDURE — 36415 COLL VENOUS BLD VENIPUNCTURE: CPT

## 2020-01-03 PROCEDURE — 99213 OFFICE O/P EST LOW 20 MIN: CPT | Mod: 25

## 2020-01-05 ENCOUNTER — TRANSCRIPTION ENCOUNTER (OUTPATIENT)
Age: 25
End: 2020-01-05

## 2020-01-08 ENCOUNTER — APPOINTMENT (OUTPATIENT)
Dept: PULMONOLOGY | Facility: CLINIC | Age: 25
End: 2020-01-08

## 2020-01-08 LAB
25(OH)D3 SERPL-MCNC: 30.8 NG/ML
APPEARANCE: CLEAR
BACTERIA UR CULT: NORMAL
BACTERIA: NEGATIVE
BASOPHILS # BLD AUTO: 0.09 K/UL
BASOPHILS NFR BLD AUTO: 0.9 %
BILIRUBIN URINE: NEGATIVE
BLOOD URINE: NEGATIVE
COLOR: COLORLESS
EOSINOPHIL # BLD AUTO: 0.08 K/UL
EOSINOPHIL NFR BLD AUTO: 0.8 %
ESTIMATED AVERAGE GLUCOSE: 103 MG/DL
FERRITIN SERPL-MCNC: 7 NG/ML
FOLATE SERPL-MCNC: 17.5 NG/ML
GLUCOSE QUALITATIVE U: NEGATIVE
HBA1C MFR BLD HPLC: 5.2 %
HCT VFR BLD CALC: 45 %
HGB BLD-MCNC: 13.9 G/DL
HYALINE CASTS: 1 /LPF
IMM GRANULOCYTES NFR BLD AUTO: 0.3 %
IRON SATN MFR SERPL: 13 %
IRON SERPL-MCNC: 54 UG/DL
KETONES URINE: NEGATIVE
LEUKOCYTE ESTERASE URINE: NEGATIVE
LYMPHOCYTES # BLD AUTO: 2.17 K/UL
LYMPHOCYTES NFR BLD AUTO: 20.8 %
MAN DIFF?: NORMAL
MCHC RBC-ENTMCNC: 24.8 PG
MCHC RBC-ENTMCNC: 30.9 GM/DL
MCV RBC AUTO: 80.4 FL
MICROSCOPIC-UA: NORMAL
MONOCYTES # BLD AUTO: 0.82 K/UL
MONOCYTES NFR BLD AUTO: 7.9 %
NEUTROPHILS # BLD AUTO: 7.23 K/UL
NEUTROPHILS NFR BLD AUTO: 69.3 %
NITRITE URINE: NEGATIVE
PH URINE: 7.5
PLATELET # BLD AUTO: 421 K/UL
PROTEIN URINE: NEGATIVE
RBC # BLD: 5.6 M/UL
RBC # FLD: 14 %
RED BLOOD CELLS URINE: 1 /HPF
SPECIFIC GRAVITY URINE: 1
SQUAMOUS EPITHELIAL CELLS: 2 /HPF
TIBC SERPL-MCNC: 418 UG/DL
UIBC SERPL-MCNC: 364 UG/DL
UROBILINOGEN URINE: NORMAL
VIT B12 SERPL-MCNC: 617 PG/ML
WBC # FLD AUTO: 10.42 K/UL
WHITE BLOOD CELLS URINE: 1 /HPF

## 2020-01-09 ENCOUNTER — APPOINTMENT (OUTPATIENT)
Dept: PULMONOLOGY | Facility: CLINIC | Age: 25
End: 2020-01-09
Payer: COMMERCIAL

## 2020-01-09 ENCOUNTER — APPOINTMENT (OUTPATIENT)
Dept: RADIOLOGY | Facility: CLINIC | Age: 25
End: 2020-01-09
Payer: COMMERCIAL

## 2020-01-09 ENCOUNTER — OUTPATIENT (OUTPATIENT)
Dept: OUTPATIENT SERVICES | Facility: HOSPITAL | Age: 25
LOS: 1 days | End: 2020-01-09
Payer: COMMERCIAL

## 2020-01-09 VITALS
SYSTOLIC BLOOD PRESSURE: 120 MMHG | RESPIRATION RATE: 17 BRPM | BODY MASS INDEX: 25.19 KG/M2 | OXYGEN SATURATION: 98 % | DIASTOLIC BLOOD PRESSURE: 70 MMHG | WEIGHT: 120 LBS | HEIGHT: 58 IN | HEART RATE: 108 BPM

## 2020-01-09 DIAGNOSIS — R05 COUGH: ICD-10-CM

## 2020-01-09 PROCEDURE — 99214 OFFICE O/P EST MOD 30 MIN: CPT

## 2020-01-09 PROCEDURE — 71046 X-RAY EXAM CHEST 2 VIEWS: CPT | Mod: 26

## 2020-01-09 PROCEDURE — 71046 X-RAY EXAM CHEST 2 VIEWS: CPT

## 2020-01-09 RX ORDER — MOMETASONE FUROATE 220 UG/1
220 INHALANT RESPIRATORY (INHALATION) TWICE DAILY
Qty: 1 | Refills: 5 | Status: DISCONTINUED | COMMUNITY
Start: 2019-10-10 | End: 2020-01-09

## 2020-01-09 RX ORDER — BECLOMETHASONE DIPROPIONATE HFA 40 UG/1
40 AEROSOL, METERED RESPIRATORY (INHALATION) TWICE DAILY
Qty: 1 | Refills: 1 | Status: DISCONTINUED | COMMUNITY
Start: 2019-06-28 | End: 2020-01-09

## 2020-01-09 RX ORDER — MOMETASONE FUROATE 110 UG/1
110 INHALANT RESPIRATORY (INHALATION) TWICE DAILY
Qty: 1 | Refills: 5 | Status: DISCONTINUED | COMMUNITY
Start: 2019-06-27 | End: 2020-01-09

## 2020-01-09 RX ORDER — MOMETASONE FUROATE 110 UG/1
110 INHALANT RESPIRATORY (INHALATION) TWICE DAILY
Qty: 1 | Refills: 5 | Status: DISCONTINUED | COMMUNITY
Start: 2019-08-19 | End: 2020-01-09

## 2020-01-09 NOTE — HISTORY OF PRESENT ILLNESS
[FreeTextEntry1] : Ms. Murillo is a 24 year old female with Hx of asthma and allergies presents for an acute visit.\par Her CC is cough & chest congestion x 4 days. \par \par She states that on 1/5 she had a bad sore throat for which she presented to [facility] [place].  She had a rapid strep test which was negative. Her sore throat persists, but as of 4 days ago she started to have chest congestion as well.\par \par She states that her cough is worse at night. She notes that during her coughing fits she gets heart palpitations and dizziness.  Her cough is productive of green sputum.  She has felt extremely fatigued, has had decreased appetite, worsening dyspnea on exertion, nasal and sinus congestion, post-nasal drip, and has heard herself wheezing. She notes that she has been having chills and night sweats - the highest temperature she states being 100.\par \par Pt. denies N/V/D, reflux symptoms, and SOB @ rest.

## 2020-01-09 NOTE — REVIEW OF SYSTEMS
[Fatigue] : fatigue [Chills] : chills [Poor Appetite] : poor appetite [Nasal Congestion] : nasal congestion [Postnasal Drip] : postnasal drip [Sore Throat] : sore throat [Sinus Problems] : sinus problems [As Noted in HPI] : as noted in HPI [Cough] : cough [Dyspnea] : dyspnea [Sputum] : sputum  [Chest Tightness] : chest tightness [Wheezing] : wheezing [Negative] : Pulmonary Hypertension

## 2020-01-09 NOTE — PHYSICAL EXAM
[General Appearance - Well Developed] : well developed [Well Groomed] : well groomed [Normal Appearance] : normal appearance [General Appearance - Well Nourished] : well nourished [Normal Conjunctiva] : the conjunctiva exhibited no abnormalities [No Deformities] : no deformities [General Appearance - In No Acute Distress] : no acute distress [II] : II [Neck Appearance] : the appearance of the neck was normal [Heart Sounds] : normal S1 and S2 [Heart Rate And Rhythm] : heart rate and rhythm were normal [Murmurs] : no murmurs present [FreeTextEntry1] : Bilateral diffuse expiratory wheezes auscultated.  [Abnormal Walk] : normal gait [Cyanosis, Localized] : no localized cyanosis [Nail Clubbing] : no clubbing of the fingernails [Skin Turgor] : normal skin turgor [Skin Color & Pigmentation] : normal skin color and pigmentation [] : no rash [Oriented To Time, Place, And Person] : oriented to person, place, and time [Impaired Insight] : insight and judgment were intact [Affect] : the affect was normal [Mood] : the mood was normal

## 2020-01-09 NOTE — ASSESSMENT
[FreeTextEntry1] : The plan for the patient is as follows:\par \par 1. Allergy-induced asthma exacerbation:\par - Add Prednisone 40mg PO x 7 days.\par - Add Ventolin rescue inhaler TID for the next week.\par - Continue Asmanex HFA 2 puffs BID; rinse and gargle after use. Pt switched off Asmanex twisthaler and on to HFA as per patient request.\par \par 2. Chest Congestion:\par - CXR RX given to patient; patient to report to [facility] for CXR. \par - Continue Mucinex 12 HR.\par - Increase PO Hydration. \par \par Will f/u with patient when CXR results come in.  Patient to call on Monday if not better.\par Pt. verbalizes understanding.

## 2020-01-14 ENCOUNTER — APPOINTMENT (OUTPATIENT)
Dept: FAMILY MEDICINE | Facility: CLINIC | Age: 25
End: 2020-01-14

## 2020-03-18 RX ORDER — DOXYCYCLINE HYCLATE 100 MG/1
100 TABLET ORAL
Qty: 14 | Refills: 0 | Status: DISCONTINUED | COMMUNITY
Start: 2019-10-11 | End: 2020-03-18

## 2020-03-19 ENCOUNTER — TRANSCRIPTION ENCOUNTER (OUTPATIENT)
Age: 25
End: 2020-03-19

## 2020-03-23 ENCOUNTER — APPOINTMENT (OUTPATIENT)
Dept: PULMONOLOGY | Facility: CLINIC | Age: 25
End: 2020-03-23

## 2020-03-27 ENCOUNTER — APPOINTMENT (OUTPATIENT)
Dept: FAMILY MEDICINE | Facility: CLINIC | Age: 25
End: 2020-03-27
Payer: COMMERCIAL

## 2020-03-27 PROCEDURE — 99213 OFFICE O/P EST LOW 20 MIN: CPT

## 2020-05-10 ENCOUNTER — TRANSCRIPTION ENCOUNTER (OUTPATIENT)
Age: 25
End: 2020-05-10

## 2020-05-14 ENCOUNTER — TRANSCRIPTION ENCOUNTER (OUTPATIENT)
Age: 25
End: 2020-05-14

## 2020-05-22 ENCOUNTER — APPOINTMENT (OUTPATIENT)
Dept: FAMILY MEDICINE | Facility: CLINIC | Age: 25
End: 2020-05-22
Payer: COMMERCIAL

## 2020-05-22 PROCEDURE — 99213 OFFICE O/P EST LOW 20 MIN: CPT | Mod: 95

## 2020-05-22 NOTE — REVIEW OF SYSTEMS
[Earache] : earache [Hearing Loss] : hearing loss [Postnasal Drip] : postnasal drip [Negative] : Respiratory [Nasal Discharge] : no nasal discharge [Sore Throat] : no sore throat

## 2020-05-22 NOTE — HISTORY OF PRESENT ILLNESS
[Home] : at home, [unfilled] , at the time of the visit. [Medical Office: ([facility])___] : at the medical office located in  [Verbal consent obtained from patient] : the patient, [unfilled] [FreeTextEntry8] : Ms. EL AMBROCIO  is a 25 year old female presenting for left ear pain. May 7 colored hair, May 10th went to  for left ear pain. Hard to ear, hearing loss, got [place] poly HC drops 4 drops BID for 7 days. Then went back 7 days later for Doxycycline 100 mg BID for 7 days finished 5/18 and feels like she still has the ear infection. Reports that there is still a lot of pain, hard to hear, feels ear is swollen. She is also having slight nasal pressure and congestion. Takes Zyrtec and nose drops.

## 2020-07-26 ENCOUNTER — TRANSCRIPTION ENCOUNTER (OUTPATIENT)
Age: 25
End: 2020-07-26

## 2020-07-27 ENCOUNTER — TRANSCRIPTION ENCOUNTER (OUTPATIENT)
Age: 25
End: 2020-07-27

## 2020-07-30 ENCOUNTER — APPOINTMENT (OUTPATIENT)
Dept: FAMILY MEDICINE | Facility: CLINIC | Age: 25
End: 2020-07-30
Payer: COMMERCIAL

## 2020-07-30 VITALS
TEMPERATURE: 98.1 F | DIASTOLIC BLOOD PRESSURE: 64 MMHG | HEART RATE: 100 BPM | SYSTOLIC BLOOD PRESSURE: 122 MMHG | RESPIRATION RATE: 18 BRPM | OXYGEN SATURATION: 98 %

## 2020-07-30 PROCEDURE — 99213 OFFICE O/P EST LOW 20 MIN: CPT

## 2020-07-30 NOTE — PHYSICAL EXAM
[No Lymphadenopathy] : no lymphadenopathy [Speech Grossly Normal] : speech grossly normal [Alert and Oriented x3] : oriented to person, place, and time [Normal Mood] : the mood was normal [Normal] : affect was normal and insight and judgment were intact

## 2020-07-31 NOTE — HISTORY OF PRESENT ILLNESS
[FreeTextEntry8] : 26 yo female presents to the office for persistent loss of taste. the patient reports that on sunday night, she noticed that she lost her sense of smell and taste. She went to  on monday to get a COVID swab, which she found out yesterday was negative. Monday night, her sense of smell returned, but she felt like she still couldn't taste. She has tried drinking tequila and cooking with a lot of spices, and reports that she feels the heat, but doesn't taste anything. She denies any nausea, vomiting, fevers, chills, body aches, cough, shortness of breath, or headaches. 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

## 2020-08-18 ENCOUNTER — APPOINTMENT (OUTPATIENT)
Dept: FAMILY MEDICINE | Facility: CLINIC | Age: 25
End: 2020-08-18

## 2020-09-14 ENCOUNTER — APPOINTMENT (OUTPATIENT)
Dept: FAMILY MEDICINE | Facility: CLINIC | Age: 25
End: 2020-09-14
Payer: COMMERCIAL

## 2020-09-14 VITALS
HEIGHT: 58 IN | RESPIRATION RATE: 18 BRPM | TEMPERATURE: 98.7 F | OXYGEN SATURATION: 97 % | SYSTOLIC BLOOD PRESSURE: 120 MMHG | WEIGHT: 125 LBS | HEART RATE: 88 BPM | DIASTOLIC BLOOD PRESSURE: 72 MMHG | BODY MASS INDEX: 26.24 KG/M2

## 2020-09-14 DIAGNOSIS — Z00.00 ENCOUNTER FOR GENERAL ADULT MEDICAL EXAMINATION W/OUT ABNORMAL FINDINGS: ICD-10-CM

## 2020-09-14 PROCEDURE — G0442 ANNUAL ALCOHOL SCREEN 15 MIN: CPT

## 2020-09-14 PROCEDURE — 99395 PREV VISIT EST AGE 18-39: CPT | Mod: 25

## 2020-09-14 PROCEDURE — G0444 DEPRESSION SCREEN ANNUAL: CPT

## 2020-09-14 PROCEDURE — 86580 TB INTRADERMAL TEST: CPT

## 2020-09-14 NOTE — HEALTH RISK ASSESSMENT
[Excellent] : ~his/her~  mood as  excellent [Yes] : Yes [2 - 4 times a month (2 pts)] : 2-4 times a month (2 points) [1 or 2 (0 pts)] : 1 or 2 (0 points) [Never (0 pts)] : Never (0 points) [No] : In the past 12 months have you used drugs other than those required for medical reasons? No [No falls in past year] : Patient reported no falls in the past year [0] : 2) Feeling down, depressed, or hopeless: Not at all (0) [Patient reported PAP Smear was normal] : Patient reported PAP Smear was normal [Patient reported colonoscopy was abnormal] : Patient reported colonoscopy was abnormal [None] : None [Employed] : employed [Alone] : lives alone [Single] : single [College] : College [Sexually Active] : sexually active [Feels Safe at Home] : Feels safe at home [Fully functional (bathing, dressing, toileting, transferring, walking, feeding)] : Fully functional (bathing, dressing, toileting, transferring, walking, feeding) [Fully functional (using the telephone, shopping, preparing meals, housekeeping, doing laundry, using] : Fully functional and needs no help or supervision to perform IADLs (using the telephone, shopping, preparing meals, housekeeping, doing laundry, using transportation, managing medications and managing finances) [Reports normal functional visual acuity (ie: able to read med bottle)] : Reports normal functional visual acuity [] : No [Audit-CScore] : 2 [de-identified] : regular exercise [de-identified] : very healthy [[id number]] : 0 [Reports changes in hearing] : Reports no changes in hearing [High Risk Behavior] : no high risk behavior [Reports changes in vision] : Reports no changes in vision [PapSmearDate] : 07/2020 [ColonoscopyDate] : 2010 [ColonoscopyComments] : polyps, IBS [FreeTextEntry2] : [facility] [facility]

## 2020-09-14 NOTE — HISTORY OF PRESENT ILLNESS
[FreeTextEntry1] : 24 yo female with a history of asthma (dulera, azelastine) presents to the office for a physical. 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

## 2020-09-18 ENCOUNTER — RESULT CHARGE (OUTPATIENT)
Age: 25
End: 2020-09-18

## 2020-09-18 LAB
APPEARANCE: ABNORMAL
BACTERIA: ABNORMAL
BILIRUBIN URINE: NEGATIVE
BLOOD URINE: ABNORMAL
COLOR: NORMAL
GLUCOSE QUALITATIVE U: NEGATIVE
HYALINE CASTS: 0 /LPF
KETONES URINE: NEGATIVE
LEUKOCYTE ESTERASE URINE: ABNORMAL
MICROSCOPIC-UA: NORMAL
NITRITE URINE: NEGATIVE
PH URINE: 7
PROTEIN URINE: NEGATIVE
RED BLOOD CELLS URINE: 3 /HPF
SPECIFIC GRAVITY URINE: 1.01
SQUAMOUS EPITHELIAL CELLS: 4 /HPF
UROBILINOGEN URINE: NORMAL
WHITE BLOOD CELLS URINE: 3 /HPF

## 2020-09-19 LAB — BACTERIA UR CULT: NORMAL

## 2020-10-05 LAB — HBA1C MFR BLD HPLC: 5.2

## 2020-10-26 ENCOUNTER — APPOINTMENT (OUTPATIENT)
Age: 25
End: 2020-10-26

## 2020-11-05 ENCOUNTER — NON-APPOINTMENT (OUTPATIENT)
Age: 25
End: 2020-11-05

## 2020-11-17 ENCOUNTER — TRANSCRIPTION ENCOUNTER (OUTPATIENT)
Age: 25
End: 2020-11-17

## 2020-11-30 ENCOUNTER — TRANSCRIPTION ENCOUNTER (OUTPATIENT)
Age: 25
End: 2020-11-30

## 2020-12-03 ENCOUNTER — TRANSCRIPTION ENCOUNTER (OUTPATIENT)
Age: 25
End: 2020-12-03

## 2020-12-29 ENCOUNTER — TRANSCRIPTION ENCOUNTER (OUTPATIENT)
Age: 25
End: 2020-12-29

## 2020-12-30 ENCOUNTER — APPOINTMENT (OUTPATIENT)
Dept: FAMILY MEDICINE | Facility: CLINIC | Age: 25
End: 2020-12-30

## 2021-03-08 ENCOUNTER — TRANSCRIPTION ENCOUNTER (OUTPATIENT)
Age: 26
End: 2021-03-08

## 2021-03-09 ENCOUNTER — TRANSCRIPTION ENCOUNTER (OUTPATIENT)
Age: 26
End: 2021-03-09

## 2021-03-09 ENCOUNTER — APPOINTMENT (OUTPATIENT)
Dept: PULMONOLOGY | Facility: CLINIC | Age: 26
End: 2021-03-09
Payer: COMMERCIAL

## 2021-03-09 VITALS — BODY MASS INDEX: 26.24 KG/M2 | HEIGHT: 58 IN | WEIGHT: 125 LBS

## 2021-03-09 PROCEDURE — 99214 OFFICE O/P EST MOD 30 MIN: CPT | Mod: 95

## 2021-03-09 RX ORDER — MOMETASONE FUROATE 200 UG/1
200 AEROSOL RESPIRATORY (INHALATION)
Qty: 3 | Refills: 1 | Status: DISCONTINUED | COMMUNITY
Start: 2020-01-09 | End: 2021-03-09

## 2021-03-09 NOTE — PHYSICAL EXAM
[No Acute Distress] : no acute distress [No Focal Deficits] : no focal deficits [Oriented x3] : oriented x3

## 2021-03-09 NOTE — HISTORY OF PRESENT ILLNESS
[Home] : at home, [unfilled] , at the time of the visit. [Medical Office: ([facility])___] : at the medical office located in  [Verbal consent obtained from patient] : the patient, [unfilled] [Never] : never [TextBox_4] : 26 year old female Hx mild persistent asthma presents COVID rapid test positve PCR results pending.

## 2021-03-09 NOTE — COUNSELING
[Needs reinforcement, provided] : Patient needs reinforcement on understanding of lifestyle changes and steps needed to achieve self management goal; reinforcement was provided

## 2021-03-09 NOTE — ASSESSMENT
[FreeTextEntry1] : 26 year old female Hx allergy induced asthma presents COVID positive\par \par CROWN patient\par Initiate Home lab draw COVID protocol\par Albuterol via nebulizer twice daily prior to Budesonide via nebulizer twice daily and every 4-6 hours as needed\par Dulera 100-5 2 puffs BID\par Ecotrin 325 mg daily x 30 days\par Famotidine 40 mg QHS\par Vit D, Vit C, Quercetin, Melatonin, Zinc as directed \par \par Further follow up pending lab results. Latoshan instructed to check pulse oximetry Q4 hours and report desaturation > 4%\par \par Follow up 1 week TEB

## 2021-03-10 LAB
ALBUMIN SERPL ELPH-MCNC: 4.8 G/DL
ALP BLD-CCNC: 63 U/L
ALT SERPL-CCNC: 20 U/L
ANION GAP SERPL CALC-SCNC: 15 MMOL/L
AST SERPL-CCNC: 16 U/L
BASOPHILS # BLD AUTO: 0.07 K/UL
BASOPHILS NFR BLD AUTO: 1 %
BILIRUB SERPL-MCNC: <0.2 MG/DL
BUN SERPL-MCNC: 8 MG/DL
CALCIUM SERPL-MCNC: 9.8 MG/DL
CHLORIDE SERPL-SCNC: 101 MMOL/L
CO2 SERPL-SCNC: 24 MMOL/L
CREAT SERPL-MCNC: 1.06 MG/DL
CRP SERPL-MCNC: 8 MG/L
DEPRECATED D DIMER PPP IA-ACNC: <150 NG/ML DDU
EOSINOPHIL # BLD AUTO: 0.03 K/UL
EOSINOPHIL NFR BLD AUTO: 0.4 %
FERRITIN SERPL-MCNC: 74 NG/ML
GLUCOSE SERPL-MCNC: 101 MG/DL
HCT VFR BLD CALC: 44.3 %
HGB BLD-MCNC: 14.5 G/DL
IMM GRANULOCYTES NFR BLD AUTO: 0.4 %
LYMPHOCYTES # BLD AUTO: 0.73 K/UL
LYMPHOCYTES NFR BLD AUTO: 10.5 %
MAN DIFF?: NORMAL
MCHC RBC-ENTMCNC: 26.9 PG
MCHC RBC-ENTMCNC: 32.7 GM/DL
MCV RBC AUTO: 82 FL
MONOCYTES # BLD AUTO: 0.56 K/UL
MONOCYTES NFR BLD AUTO: 8.1 %
NEUTROPHILS # BLD AUTO: 5.53 K/UL
NEUTROPHILS NFR BLD AUTO: 79.6 %
PLATELET # BLD AUTO: 333 K/UL
POTASSIUM SERPL-SCNC: 4.1 MMOL/L
PROCALCITONIN SERPL-MCNC: 0.03 NG/ML
PROT SERPL-MCNC: 7.6 G/DL
RBC # BLD: 5.4 M/UL
RBC # FLD: 14 %
SODIUM SERPL-SCNC: 139 MMOL/L
WBC # FLD AUTO: 6.95 K/UL

## 2021-03-11 ENCOUNTER — NON-APPOINTMENT (OUTPATIENT)
Age: 26
End: 2021-03-11

## 2021-03-17 ENCOUNTER — APPOINTMENT (OUTPATIENT)
Age: 26
End: 2021-03-17
Payer: COMMERCIAL

## 2021-03-17 VITALS — BODY MASS INDEX: 26.24 KG/M2 | WEIGHT: 125 LBS | HEIGHT: 58 IN

## 2021-03-17 PROCEDURE — 99202 OFFICE O/P NEW SF 15 MIN: CPT | Mod: 95

## 2021-03-22 ENCOUNTER — NON-APPOINTMENT (OUTPATIENT)
Age: 26
End: 2021-03-22

## 2021-03-22 NOTE — PROCEDURE
[FreeTextEntry1] : PFT 6/5/2019 personally reviewed minimal obstructive ventilatory defect without gas exchange abnormality and insignificant bronchodilator response.\par \par CXR 1/9/20 personally reviewed clear lungs

## 2021-03-22 NOTE — HISTORY OF PRESENT ILLNESS
[Home] : at home, [unfilled] , at the time of the visit. [Medical Office: ([facility])___] : at the medical office located in  [Verbal consent obtained from patient] : the patient, [unfilled] [Never] : never [TextBox_4] : 26 year old female Hx mild persistent asthma presents COVID 19 virus infection.Patient feeling better.  Breathing is much better however continues to need to rest between long periods of ambulation.  Cough has improved.  She denies fever, chest pain or other systemic complaints.

## 2021-03-22 NOTE — ASSESSMENT
[FreeTextEntry1] : 26 year old female Hx allergy induced asthma presents s/p recent diagnosis COVID 19 virus infection\par \par Continue Dulera 100/5 2 puffs BID\par Continue Albuterol via nebulizer twice daily prior to Budesonide treatment\par Continue Ecotrin 325 mg dialy x 30 days\par Continue Famotidine 40 mg QHS\par Vit D, Vit C, Quercitin, Melatonin, Zinc as directed\par \par \par Follow up 1 week TEB

## 2021-03-22 NOTE — REVIEW OF SYSTEMS
[Fatigue] : fatigue [Poor Appetite] : poor appetite [Dyspnea] : dyspnea [SOB on Exertion] : sob on exertion [Negative] : Endocrine [Fever] : no fever

## 2021-03-24 ENCOUNTER — APPOINTMENT (OUTPATIENT)
Age: 26
End: 2021-03-24
Payer: COMMERCIAL

## 2021-03-24 VITALS — WEIGHT: 125 LBS | HEIGHT: 58 IN | BODY MASS INDEX: 26.24 KG/M2

## 2021-03-24 PROCEDURE — 99213 OFFICE O/P EST LOW 20 MIN: CPT | Mod: 95

## 2021-03-24 NOTE — HISTORY OF PRESENT ILLNESS
[Never] : never [Home] : at home, [unfilled] , at the time of the visit. [Medical Office: ([facility])___] : at the medical office located in  [Verbal consent obtained from patient] : the patient, [unfilled] [TextBox_4] : 26 year old female Hx mild persistent asthma presents COVID 19 virus infection. She is feeling much better.  She reports she is using Dulera and nebulized medications.  She is currently on prednisone and has a few more days of this.  She would like to return to work.  She is here for follow up.

## 2021-03-24 NOTE — ASSESSMENT
[FreeTextEntry1] : 26 year old female Hx allergy induced asthma presents s/p recent diagnosis COVID 19 virus infection\par \par Continue Dulera 100/5 2 puffs BID\par Continue Albuterol via nebulizer twice daily prior \par Continue Famotidine 40 mg QHS\par Vit D, Vit C, Quercitin, Melatonin, Zinc as directed\par Repeat CXR 6-8 weeks\par \par Follow up  6 weeks

## 2021-04-12 ENCOUNTER — APPOINTMENT (OUTPATIENT)
Dept: FAMILY MEDICINE | Facility: CLINIC | Age: 26
End: 2021-04-12
Payer: COMMERCIAL

## 2021-04-12 PROCEDURE — 99212 OFFICE O/P EST SF 10 MIN: CPT | Mod: 95

## 2021-04-13 ENCOUNTER — NON-APPOINTMENT (OUTPATIENT)
Age: 26
End: 2021-04-13

## 2021-04-13 NOTE — HISTORY OF PRESENT ILLNESS
[Home] : at home, [unfilled] , at the time of the visit. [Medical Office: ([facility])___] : at the medical office located in  [Verbal consent obtained from patient] : the patient, [unfilled] [FreeTextEntry8] : 25 yo female with PMHx asthma (budesonide, dulera, albuterol, famotidine) presents to the office for acute left ear pain. The patient states that she was +COVID on 3/8/21, via rapid testing. she reports having mild shortness of breath and body aches, which lasted for a few days. on 3/21, she started to experienced chest pain with coughing, and shortness of breath. she was seen in the ED and diagnosed with pneumonia, and given antibiotics and steroids. She has followed up with her established pulmonologist, and states that she is feeling better while using prescribed inhalers. For the past three days, she has developed stabbing left ear pain, which has been worsening in severity. She states that her ear hurts when she pulls on it. she denies any fevers, chills, sore throat, but states feeling some sinus pressure and congestion on the left side of her face.

## 2021-04-13 NOTE — REVIEW OF SYSTEMS
[Earache] : earache [Postnasal Drip] : postnasal drip [Negative] : Psychiatric [Hearing Loss] : no hearing loss [Nosebleed] : no nosebleeds [Hoarseness] : no hoarseness [Nasal Discharge] : no nasal discharge [Sore Throat] : no sore throat [Chest Pain] : no chest pain [Headache] : no headache [FreeTextEntry4] : left sided sinus/congestion

## 2021-04-19 ENCOUNTER — APPOINTMENT (OUTPATIENT)
Dept: PULMONOLOGY | Facility: CLINIC | Age: 26
End: 2021-04-19

## 2021-04-19 ENCOUNTER — APPOINTMENT (OUTPATIENT)
Dept: PULMONOLOGY | Facility: CLINIC | Age: 26
End: 2021-04-19
Payer: COMMERCIAL

## 2021-04-19 VITALS
OXYGEN SATURATION: 98 % | RESPIRATION RATE: 16 BRPM | HEART RATE: 107 BPM | SYSTOLIC BLOOD PRESSURE: 120 MMHG | WEIGHT: 120 LBS | BODY MASS INDEX: 25.19 KG/M2 | TEMPERATURE: 97.8 F | DIASTOLIC BLOOD PRESSURE: 80 MMHG | HEIGHT: 58 IN

## 2021-04-19 PROCEDURE — 99072 ADDL SUPL MATRL&STAF TM PHE: CPT

## 2021-04-19 PROCEDURE — 99214 OFFICE O/P EST MOD 30 MIN: CPT

## 2021-04-20 ENCOUNTER — APPOINTMENT (OUTPATIENT)
Dept: FAMILY MEDICINE | Facility: CLINIC | Age: 26
End: 2021-04-20
Payer: COMMERCIAL

## 2021-04-20 PROCEDURE — 99441: CPT

## 2021-04-20 PROCEDURE — ZZZZZ: CPT

## 2021-04-26 NOTE — HISTORY OF PRESENT ILLNESS
[Never] : never [TextBox_4] : 26 year old female Hx mild persistent asthma presents COVID 19 virus infection. Patient feeling much better however continues to experience \par chest tightness.  Patient went to [facility] [place] for worsening respiratory symptoms and thyroid nodule was found on CT chest.  Patient had subsequent ultrasound and biopsy revealing malignant thyroid nodule.  CT chest 4/16/21 personally reviewed reveals very small reticular nodular infiltrates noted in periphery of the posterolateral LLL and medial RLL.  Subcentimeter right thyroid nodule.

## 2021-04-26 NOTE — ASSESSMENT
[FreeTextEntry1] : 26 year old female Hx allergy induced asthma presents s/p recent diagnosis COVID 19 virus infection\par \par Continue Dulera 100/5 2 puffs BID\par Continue Albuterol via nebulizer twice daily prior \par Continue Famotidine 40 mg QHS\par Repeat CT chest June 2021\par PFT next visit\par \par Follow up  after PFT

## 2021-04-26 NOTE — PROCEDURE
[FreeTextEntry1] : PFT 6/5/2019 personally reviewed minimal obstructive ventilatory defect without gas exchange abnormality and insignificant bronchodilator response.\par \par CXR 1/9/20 personally reviewed clear lungs \par \par CT chest [facility] 4/16/21 personally reviewed see HPI

## 2021-05-03 NOTE — HISTORY OF PRESENT ILLNESS
[FreeTextEntry8] : Ms. EL AMBROCIO  is a 26 year old female calls for neck node that is cancerous f/u and also fullness of bladder. The node that was biopsied was on the thyroid. States that her entire neck is also swollen - seeing endo. Now she feels fullness of the bladder, constant bloating. Internal sonogram last year and bladder sono last year were normal. Feeling is on and off. This feeling has been occurring for a few months. She is not typically constipated.

## 2021-05-05 ENCOUNTER — NON-APPOINTMENT (OUTPATIENT)
Age: 26
End: 2021-05-05

## 2021-05-08 LAB — SARS-COV-2 N GENE NPH QL NAA+PROBE: NOT DETECTED

## 2021-05-10 ENCOUNTER — APPOINTMENT (OUTPATIENT)
Dept: PULMONOLOGY | Facility: CLINIC | Age: 26
End: 2021-05-10
Payer: COMMERCIAL

## 2021-05-10 VITALS
WEIGHT: 120 LBS | HEIGHT: 58 IN | RESPIRATION RATE: 16 BRPM | BODY MASS INDEX: 25.19 KG/M2 | HEART RATE: 100 BPM | DIASTOLIC BLOOD PRESSURE: 76 MMHG | OXYGEN SATURATION: 98 % | SYSTOLIC BLOOD PRESSURE: 130 MMHG | TEMPERATURE: 97.4 F

## 2021-05-10 PROCEDURE — 94010 BREATHING CAPACITY TEST: CPT

## 2021-05-10 PROCEDURE — 94729 DIFFUSING CAPACITY: CPT

## 2021-05-10 PROCEDURE — 94727 GAS DIL/WSHOT DETER LNG VOL: CPT

## 2021-05-10 PROCEDURE — ZZZZZ: CPT

## 2021-05-10 PROCEDURE — 99214 OFFICE O/P EST MOD 30 MIN: CPT | Mod: 25

## 2021-05-10 PROCEDURE — 99072 ADDL SUPL MATRL&STAF TM PHE: CPT

## 2021-05-10 NOTE — ASSESSMENT
[FreeTextEntry1] : 26 year old female Hx mild persistent asthma, COVID 19 virus infection March 2021, presents for pre surgical optimization prior to planned thyroid surgery\par \par Continue Dulera 100/5  2 puffs BID\par Albuterol via nebulizer prior to Dulera and every 4-6 hours if needed\par \par Patient pulmonary status optimized for planned thyroid surgery

## 2021-05-10 NOTE — PHYSICAL EXAM
[No Acute Distress] : no acute distress [No Focal Deficits] : no focal deficits [Oriented x3] : oriented x3 [Normal Oropharynx] : normal oropharynx [Normal Rate/Rhythm] : normal rate/rhythm [Normal S1, S2] : normal s1, s2 [No Murmurs] : no murmurs [No Resp Distress] : no resp distress [Clear to Auscultation Bilaterally] : clear to auscultation bilaterally [No Abnormalities] : no abnormalities [Benign] : benign [Normal Gait] : normal gait [No Clubbing] : no clubbing [No Cyanosis] : no cyanosis [No Edema] : no edema [FROM] : FROM [Normal Color/ Pigmentation] : normal color/ pigmentation [Normal Affect] : normal affect [TextBox_44] : right thyroid nodule

## 2021-05-10 NOTE — HISTORY OF PRESENT ILLNESS
[Never] : never [TextBox_4] : 26 year old female Hx mild persistent asthma, COVID 19 virus infection March 2021, presents for pre operative respiratory evaluation.  Patient scheduled for thyroidectomy in June.  She is feeling better after having COVID 19 virus infection March 2021.  She is using Dulera 100/5 daily as directed.  She is here for pre surgical testing prior to planned thyroid surgery.

## 2021-05-10 NOTE — PROCEDURE
[FreeTextEntry1] : PFT 6/5/2019 personally reviewed minimal obstructive ventilatory defect without gas exchange abnormality and insignificant bronchodilator response.\par \par CXR 1/9/20 personally reviewed clear lungs \par \par CT chest [facility] 3/22/21 personally reviewed see HPI\par \par PFT 5/10/21 personally reviewed mild obstructive ventilatory defect without gas exchange abnormality

## 2021-05-10 NOTE — REVIEW OF SYSTEMS
[Fatigue] : fatigue [Poor Appetite] : poor appetite [Dyspnea] : dyspnea [SOB on Exertion] : sob on exertion [Negative] : Psychiatric [Thyroid Problem] : thyroid problem [Fever] : no fever

## 2021-05-14 ENCOUNTER — APPOINTMENT (OUTPATIENT)
Dept: FAMILY MEDICINE | Facility: CLINIC | Age: 26
End: 2021-05-14
Payer: COMMERCIAL

## 2021-05-14 ENCOUNTER — NON-APPOINTMENT (OUTPATIENT)
Age: 26
End: 2021-05-14

## 2021-05-14 ENCOUNTER — RESULT REVIEW (OUTPATIENT)
Age: 26
End: 2021-05-14

## 2021-05-14 ENCOUNTER — OUTPATIENT (OUTPATIENT)
Dept: OUTPATIENT SERVICES | Facility: HOSPITAL | Age: 26
LOS: 1 days | End: 2021-05-14
Payer: COMMERCIAL

## 2021-05-14 ENCOUNTER — APPOINTMENT (OUTPATIENT)
Dept: ULTRASOUND IMAGING | Facility: HOSPITAL | Age: 26
End: 2021-05-14

## 2021-05-14 VITALS
OXYGEN SATURATION: 98 % | BODY MASS INDEX: 25.61 KG/M2 | SYSTOLIC BLOOD PRESSURE: 136 MMHG | WEIGHT: 122 LBS | DIASTOLIC BLOOD PRESSURE: 82 MMHG | TEMPERATURE: 97.9 F | HEIGHT: 58 IN | HEART RATE: 98 BPM

## 2021-05-14 DIAGNOSIS — M79.669 PAIN IN UNSPECIFIED LOWER LEG: ICD-10-CM

## 2021-05-14 PROCEDURE — 99072 ADDL SUPL MATRL&STAF TM PHE: CPT

## 2021-05-14 PROCEDURE — 99213 OFFICE O/P EST LOW 20 MIN: CPT | Mod: 25

## 2021-05-14 PROCEDURE — 93970 EXTREMITY STUDY: CPT

## 2021-05-14 PROCEDURE — 36415 COLL VENOUS BLD VENIPUNCTURE: CPT

## 2021-05-14 PROCEDURE — 93970 EXTREMITY STUDY: CPT | Mod: 26

## 2021-05-14 RX ORDER — METHYLPREDNISOLONE 4 MG/1
4 TABLET ORAL
Qty: 1 | Refills: 0 | Status: DISCONTINUED | COMMUNITY
Start: 2020-05-22 | End: 2021-05-14

## 2021-05-14 RX ORDER — AZELASTINE HYDROCHLORIDE 137 UG/1
0.1 SPRAY, METERED NASAL DAILY
Qty: 1 | Refills: 3 | Status: DISCONTINUED | COMMUNITY
Start: 2019-06-05 | End: 2021-05-14

## 2021-05-14 RX ORDER — PHENAZOPYRIDINE HYDROCHLORIDE 100 MG/1
100 TABLET ORAL 3 TIMES DAILY
Qty: 15 | Refills: 0 | Status: DISCONTINUED | COMMUNITY
Start: 2020-09-24 | End: 2021-05-14

## 2021-05-14 RX ORDER — PREDNISONE 20 MG/1
20 TABLET ORAL DAILY
Qty: 14 | Refills: 1 | Status: DISCONTINUED | COMMUNITY
Start: 2021-03-17 | End: 2021-05-14

## 2021-05-14 RX ORDER — PREDNISONE 20 MG/1
20 TABLET ORAL DAILY
Qty: 14 | Refills: 1 | Status: DISCONTINUED | COMMUNITY
Start: 2021-03-09 | End: 2021-05-14

## 2021-05-14 RX ORDER — PREDNISONE 20 MG/1
20 TABLET ORAL
Qty: 14 | Refills: 0 | Status: DISCONTINUED | COMMUNITY
Start: 2020-01-09 | End: 2021-05-14

## 2021-05-14 RX ORDER — HYDROCORTISONE 25 MG/ML
2.5 LOTION TOPICAL 3 TIMES DAILY
Qty: 1 | Refills: 0 | Status: DISCONTINUED | COMMUNITY
Start: 2019-10-11 | End: 2021-05-14

## 2021-05-14 RX ORDER — PREDNISONE 20 MG/1
20 TABLET ORAL DAILY
Qty: 7 | Refills: 0 | Status: DISCONTINUED | COMMUNITY
Start: 2019-10-11 | End: 2021-05-14

## 2021-05-14 RX ORDER — FLUTICASONE PROPIONATE 50 UG/1
50 SPRAY, METERED NASAL DAILY
Qty: 1 | Refills: 0 | Status: DISCONTINUED | COMMUNITY
Start: 2019-06-28 | End: 2021-05-14

## 2021-05-14 RX ORDER — AZITHROMYCIN 250 MG/1
250 TABLET, FILM COATED ORAL
Qty: 1 | Refills: 0 | Status: DISCONTINUED | COMMUNITY
Start: 2020-05-22 | End: 2021-05-14

## 2021-05-17 ENCOUNTER — TRANSCRIPTION ENCOUNTER (OUTPATIENT)
Age: 26
End: 2021-05-17

## 2021-05-17 LAB
ANION GAP SERPL CALC-SCNC: 14 MMOL/L
APTT BLD: 31 SEC
BASOPHILS # BLD AUTO: 0.1 K/UL
BASOPHILS NFR BLD AUTO: 1.4 %
BUN SERPL-MCNC: 15 MG/DL
CALCIUM SERPL-MCNC: 10 MG/DL
CHLORIDE SERPL-SCNC: 101 MMOL/L
CO2 SERPL-SCNC: 22 MMOL/L
CREAT SERPL-MCNC: 0.86 MG/DL
EOSINOPHIL # BLD AUTO: 0.09 K/UL
EOSINOPHIL NFR BLD AUTO: 1.2 %
GLUCOSE SERPL-MCNC: 78 MG/DL
HCT VFR BLD CALC: 44 %
HGB BLD-MCNC: 13.6 G/DL
IMM GRANULOCYTES NFR BLD AUTO: 0.4 %
INR PPP: 0.97 RATIO
LYMPHOCYTES # BLD AUTO: 1.81 K/UL
LYMPHOCYTES NFR BLD AUTO: 24.6 %
MAN DIFF?: NORMAL
MCHC RBC-ENTMCNC: 26.5 PG
MCHC RBC-ENTMCNC: 30.9 GM/DL
MCV RBC AUTO: 85.8 FL
MONOCYTES # BLD AUTO: 0.57 K/UL
MONOCYTES NFR BLD AUTO: 7.7 %
NEUTROPHILS # BLD AUTO: 4.77 K/UL
NEUTROPHILS NFR BLD AUTO: 64.7 %
PLATELET # BLD AUTO: 406 K/UL
POTASSIUM SERPL-SCNC: 4.8 MMOL/L
PT BLD: 11.5 SEC
RBC # BLD: 5.13 M/UL
RBC # FLD: 14.7 %
SODIUM SERPL-SCNC: 137 MMOL/L
WBC # FLD AUTO: 7.37 K/UL

## 2021-06-02 ENCOUNTER — APPOINTMENT (OUTPATIENT)
Dept: PULMONOLOGY | Facility: CLINIC | Age: 26
End: 2021-06-02

## 2021-06-08 ENCOUNTER — APPOINTMENT (OUTPATIENT)
Dept: PULMONOLOGY | Facility: CLINIC | Age: 26
End: 2021-06-08
Payer: COMMERCIAL

## 2021-06-08 VITALS — BODY MASS INDEX: 24.77 KG/M2 | WEIGHT: 118 LBS | HEIGHT: 58 IN

## 2021-06-08 PROCEDURE — 99213 OFFICE O/P EST LOW 20 MIN: CPT | Mod: 95

## 2021-06-09 NOTE — HISTORY OF PRESENT ILLNESS
[Home] : at home, [unfilled] , at the time of the visit. [Medical Office: ([facility])___] : at the medical office located in  [Never] : never [Verbal consent obtained from patient] : the patient, [unfilled] [TextBox_4] : Patient is a 26 year old female Hx asthma s/p recent thyroidectomy presents for follow up.  Patient at home recovering from surgery last week. Unfortunately cancer was found on vocal cord.  Patient has been struggling with vocal cord dysfunction.  She is using thickened liquids however has difficulty breathing at times.  She uses nebulizer frequently.  She is here for follow up.

## 2021-06-09 NOTE — ASSESSMENT
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

## 2021-06-09 NOTE — REVIEW OF SYSTEMS
[Fatigue] : fatigue [Poor Appetite] : poor appetite [Dyspnea] : dyspnea [SOB on Exertion] : sob on exertion [Thyroid Problem] : thyroid problem [Negative] : Psychiatric [Fever] : no fever

## 2021-06-11 ENCOUNTER — NON-APPOINTMENT (OUTPATIENT)
Age: 26
End: 2021-06-11

## 2021-06-15 ENCOUNTER — NON-APPOINTMENT (OUTPATIENT)
Age: 26
End: 2021-06-15

## 2021-06-15 ENCOUNTER — TRANSCRIPTION ENCOUNTER (OUTPATIENT)
Age: 26
End: 2021-06-15

## 2021-06-18 ENCOUNTER — APPOINTMENT (OUTPATIENT)
Dept: SPEECH THERAPY | Facility: CLINIC | Age: 26
End: 2021-06-18

## 2021-07-01 ENCOUNTER — RESULT REVIEW (OUTPATIENT)
Age: 26
End: 2021-07-01

## 2021-07-07 ENCOUNTER — APPOINTMENT (OUTPATIENT)
Dept: UROGYNECOLOGY | Facility: CLINIC | Age: 26
End: 2021-07-07

## 2021-07-07 NOTE — PHYSICAL EXAM
[Chaperone Present] : A chaperone was present in the examining room during all aspects of the physical examination [FreeTextEntry1] : \par \par General: Well, appearing. Alert and orientated. No acute distress\par HEENT: Normocephalic, atraumatic and extraocular muscles appear to be intact \par Neck: Full range of motion, no obvious lymphadenopathy, deformities, or masses noted \par Respiratory: Speaking in full sentences comfortably, normal work of breathing and no cough during visit\par Musculoskeletal: active full range of motion in extremities \par Extremities: No upper extremity edema noted\par Skin: no obvious rash or skin lesions\par Neuro: Orientated X 3, speech is fluent, normal rate\par Psych: Normal mood and affect\par  [Normal Appearance] : general appearance was normal [Normal] : normal [Uterine Adnexae] : were not tender and not enlarged

## 2021-07-07 NOTE — PROCEDURE
[FreeTextEntry1] : \par \par Sterile straight catheterization was performed to rule out infection and to measure a postvoid residual volume which was 30 cc\par

## 2021-07-08 ENCOUNTER — APPOINTMENT (OUTPATIENT)
Dept: FAMILY MEDICINE | Facility: CLINIC | Age: 26
End: 2021-07-08
Payer: COMMERCIAL

## 2021-07-08 PROCEDURE — 99215 OFFICE O/P EST HI 40 MIN: CPT

## 2021-07-08 PROCEDURE — 99072 ADDL SUPL MATRL&STAF TM PHE: CPT

## 2021-07-08 RX ORDER — ASPIRIN/ACETAMINOPHEN/CAFFEINE 500-325-65
325 POWDER IN PACKET (EA) ORAL
Qty: 30 | Refills: 0 | Status: COMPLETED | COMMUNITY
Start: 2021-03-09 | End: 2021-07-08

## 2021-07-08 RX ORDER — DEXTROAMPHETAMINE SACCHARATE, AMPHETAMINE ASPARTATE, DEXTROAMPHETAMINE SULFATE AND AMPHETAMINE SULFATE 1.25; 1.25; 1.25; 1.25 MG/1; MG/1; MG/1; MG/1
5 TABLET ORAL
Qty: 30 | Refills: 0 | Status: COMPLETED | COMMUNITY
Start: 2019-05-22 | End: 2021-07-08

## 2021-07-08 RX ORDER — FAMOTIDINE 40 MG/1
40 TABLET, FILM COATED ORAL
Qty: 1 | Refills: 3 | Status: COMPLETED | COMMUNITY
Start: 2021-03-09 | End: 2021-07-08

## 2021-07-08 RX ORDER — ULIPRISTAL ACETATE 30 MG/1
30 TABLET ORAL
Qty: 1 | Refills: 0 | Status: COMPLETED | COMMUNITY
Start: 2021-01-12

## 2021-07-08 RX ORDER — LEVOTHYROXINE SODIUM 0.1 MG/1
100 TABLET ORAL
Qty: 30 | Refills: 0 | Status: COMPLETED | COMMUNITY
Start: 2021-06-03

## 2021-07-08 RX ORDER — BUDESONIDE 0.5 MG/2ML
0.5 INHALANT ORAL
Qty: 1 | Refills: 1 | Status: COMPLETED | COMMUNITY
Start: 2021-03-09 | End: 2021-07-08

## 2021-07-14 VITALS
DIASTOLIC BLOOD PRESSURE: 76 MMHG | RESPIRATION RATE: 16 BRPM | OXYGEN SATURATION: 97 % | TEMPERATURE: 98.2 F | HEART RATE: 93 BPM | SYSTOLIC BLOOD PRESSURE: 132 MMHG

## 2021-07-14 NOTE — REVIEW OF SYSTEMS
[Fatigue] : fatigue [Night Sweats] : night sweats [Sore Throat] : sore throat [Joint Pain] : joint pain [Muscle Pain] : muscle pain [Itching] : Itching [Hair Changes] : hair changes [Skin Rash] : skin rash [Insomnia] : insomnia [Anxiety] : anxiety [Negative] : Neurological [Fever] : no fever [Chills] : no chills [Hot Flashes] : no hot flashes [Recent Change In Weight] : ~T no recent weight change [Earache] : no earache [Nosebleed] : no nosebleeds [Hoarseness] : no hoarseness [Nasal Discharge] : no nasal discharge [Postnasal Drip] : no postnasal drip [Joint Stiffness] : no joint stiffness [Joint Swelling] : no joint swelling [Muscle Weakness] : no muscle weakness [Back Pain] : no back pain [Suicidal] : not suicidal [Depression] : no depression [FreeTextEntry4] : related to vocal cord paralysis [FreeTextEntry9] : left lower leg [de-identified] : hair loss

## 2021-07-14 NOTE — PHYSICAL EXAM
[Normal Outer Ear/Nose] : the outer ears and nose were normal in appearance [Normal Oropharynx] : the oropharynx was normal [Normal TMs] : both tympanic membranes were normal [Coordination Grossly Intact] : coordination grossly intact [No Focal Deficits] : no focal deficits [Normal Gait] : normal gait [Speech Grossly Normal] : speech grossly normal [Alert and Oriented x3] : oriented to person, place, and time [Normal Mood] : the mood was normal [Normal] : affect was normal and insight and judgment were intact [de-identified] : large scar noted to right side of neck, anterior to posterior

## 2021-07-14 NOTE — END OF VISIT
[Time Spent: ___ minutes] : I have spent [unfilled] minutes of time on the encounter. [FreeTextEntry3] : I personally discussed this patient with  the Nurse Practitioner at the time of the visit.  I agree with the findings and plan as documented in the Nurse Practitioner's note, unless noted below.\par \par

## 2021-07-14 NOTE — HISTORY OF PRESENT ILLNESS
[FreeTextEntry1] : 25 yo female with PMHx thyroid ca with resection presents to the office for a f/u after her recent surgery. [de-identified] : The patient reports feling "okay" states that she is slowly recovering from her recently thyroidectomy and lymph node dissection for thyroid cancer. the patient states that they removed 25 lymph nodes surrounding her right thyroid gland. she reports having some difficulty with speaking, and was told that her vocal cord is 'partially paralyzed, but might get better with time and speech therapy' she also has some difficulty if she drinks liquids too quickly, and has been trying to eat slower, and drinking some thickened liquids, and taking her medications with apple sauce. Her medications are being titrated and closely monitored by her established endocrinologist. she is currently on calcitrol 0.25mcg once a day, and tirosint 100mcg once a day. she has a pending appointment with an oncologist to discuss the type and frequency of radiation she might need when her pathology results are completed. in addition, the patient states that she is still having persistent left lower leg discomfort, which she describes as pressure/burning. she's had an ultrasound in the past, which was negative for any acute DVT's. she denies any swelling, redness, or temperature changes. in addition, she reports hair loss, generalized fatigue, not sleeping well, and itchy rashes that appear, seemingly unrelated to any environmental factors.

## 2021-07-20 ENCOUNTER — NON-APPOINTMENT (OUTPATIENT)
Age: 26
End: 2021-07-20

## 2021-07-20 ENCOUNTER — LABORATORY RESULT (OUTPATIENT)
Age: 26
End: 2021-07-20

## 2021-07-20 ENCOUNTER — APPOINTMENT (OUTPATIENT)
Dept: RHEUMATOLOGY | Facility: CLINIC | Age: 26
End: 2021-07-20
Payer: COMMERCIAL

## 2021-07-20 VITALS
TEMPERATURE: 97.9 F | SYSTOLIC BLOOD PRESSURE: 116 MMHG | BODY MASS INDEX: 24.87 KG/M2 | HEART RATE: 108 BPM | OXYGEN SATURATION: 98 % | WEIGHT: 118.5 LBS | HEIGHT: 58 IN | DIASTOLIC BLOOD PRESSURE: 68 MMHG

## 2021-07-20 PROCEDURE — 36415 COLL VENOUS BLD VENIPUNCTURE: CPT

## 2021-07-20 PROCEDURE — 99204 OFFICE O/P NEW MOD 45 MIN: CPT | Mod: 25

## 2021-07-20 PROCEDURE — 99072 ADDL SUPL MATRL&STAF TM PHE: CPT

## 2021-07-20 RX ORDER — ALPRAZOLAM 0.25 MG/1
0.25 TABLET ORAL
Qty: 7 | Refills: 0 | Status: DISCONTINUED | COMMUNITY
Start: 2020-02-27 | End: 2021-07-20

## 2021-07-20 RX ORDER — LEVONORGESTREL AND ETHINYL ESTRADIOL 0.1-0.02MG
0.1-2 KIT ORAL
Qty: 84 | Refills: 0 | Status: DISCONTINUED | COMMUNITY
Start: 2020-07-21 | End: 2021-07-20

## 2021-07-21 ENCOUNTER — APPOINTMENT (OUTPATIENT)
Age: 26
End: 2021-07-21
Payer: COMMERCIAL

## 2021-07-21 VITALS
SYSTOLIC BLOOD PRESSURE: 138 MMHG | HEIGHT: 58 IN | WEIGHT: 118 LBS | RESPIRATION RATE: 16 BRPM | BODY MASS INDEX: 24.77 KG/M2 | TEMPERATURE: 98 F | DIASTOLIC BLOOD PRESSURE: 80 MMHG | HEART RATE: 109 BPM | OXYGEN SATURATION: 99 %

## 2021-07-21 PROCEDURE — 99214 OFFICE O/P EST MOD 30 MIN: CPT

## 2021-07-21 PROCEDURE — 99072 ADDL SUPL MATRL&STAF TM PHE: CPT

## 2021-07-21 RX ORDER — MOMETASONE FUROATE AND FORMOTEROL FUMARATE DIHYDRATE 100; 5 UG/1; UG/1
100-5 AEROSOL RESPIRATORY (INHALATION)
Qty: 3 | Refills: 3 | Status: DISCONTINUED | COMMUNITY
Start: 2020-10-16 | End: 2021-07-21

## 2021-07-21 NOTE — CONSULT LETTER
[Dear  ___] : Dear  [unfilled], [Consult Letter:] : I had the pleasure of evaluating your patient, [unfilled]. [Consult Closing:] : Thank you very much for allowing me to participate in the care of this patient.  If you have any questions, please do not hesitate to contact me. [Sincerely,] : Sincerely, [DrTeetee  ___] : Dr. ALVA [FreeTextEntry2] : Breanna Friedman MD  [FreeTextEntry3] : Lupis Dorsey DNP, ANP-C\par Division or Rheumatology\par [facility]\par \par  [FreeTextEntry1] : Please see below for summary of  recent rheumatology evaluation and recommendations.\par \par 27 yo wf previously healthy with ys of Asthma, ADHD (on Adderall),  Migraines, IBS C/D (well controlled w/ gluten free diet).. was doing well till Covid infection 3/21.. followed by persistent severe chest pressure and lymphadenopathy- eventually dx w/ thyroid cancer.  Now s/p total thyroidectomy 6/21.. \par Pending radiation therapy. \par THyroid function stable throughout, no mnt hyperthyroid to minimize malignancy... \par Presents to rheum for persistent fatigue, diffuse myalgias, hair loss.. r/o AI disease. \par \par 1) Diffuse myalgias, fatigue (overwhelming) and dyscognition with hx migraines and IBS.  \par NRS- considerable issue w/ sleep over past few mnths, trouble falling/ staying asleep, rarely more than 3 hs consecutively unless hasn't slept in days then sleeping 12+ hours.  \par IBS: C/D:  controlled \par Migraines: worse prior to thyroidectomy with diffuse prominent lymphadenopathy R side, all much improved. \par ADHD:  stable, tried to hold adderall after thyroidectomy but fatigue too overwhelming\par Paresthesia: L arm numbness (not tingling).. in hand and arm/ radiating to entire LLE.  NO weakness, Working w/ neuro (Dr. Brothers) MRA neg for MS / CVA.. note prior to surgery, unclear if present before COVID...\par Alopecia: considerable past few mnth\par Rash:  migratory lesions x many ys...precise etiology unclear in past  \par ROS: + oral sicca, low grade temp   intermittent flulike sx several times/ yr lasting 1-3 days (misses work when active), NO raynauds (but hand pain in cold)\par NOTE: \par - failed melatonin in past\par - endoscopy/ colonoscopy 10 ys ago negative.. no evidence of inflammatory bowel disease. \par \par Discussion:  \par Hx suggestive of possible CTD/ AI condition but also consider dramatic changes in past few mnths- COVID infection followed by thyroid cancer with localized advanced lymphadenopathy..... \par Many of complaints preceed thyroid ca dx:  flulike sx, fatigue, myalgias, hair loss, rash.. but functionally doing well prior to COVID.  \par Will get rheum studies now but focus needs to be on attention to sleep, conditioning ... \par \par 2) Thyroid cancer:  total thyroidectomy and 25+ lymph nodes removed... vocal cord paralysis.. pending Radiation therapy... \par thyroid ca recently dx following COVID infection (pulm, loss taste/ smell, migraines x 30 days and brain fog) found when continued to feel poorly (Elias Karimi) ... eventually total thyroidectomy 6/1/21 over 25 lymph node dissection... scheduled to start radiation.. next few wks.. \par TFTs currently mnt at hyperthyroidism.. to minimize\par No palpitations despite high thyroid replacement but "can't fall asleep" \par \par 3) Asthma/ Covid Infection: 3/21 classic presentation most persistent since recovery, chronic fatigue and chest wall tightness... working w/ pulm... \par PFTS 5/21 obstructive w/ no ventilatory defect. Continues routine Dulera/ albuterol.. \par CTscan chest:  3/21 \par \par Plan:\par - Labs now.. full SLE serologies \par \par - would like to review CTscan (3/21- [facility])\par \par - Try  add Zinc 44 mg and Magnesium 200-400 mg nightly\par - Valerian root  300-600 mg\par - sleepy time tea\par - continue meditation, relaxation techniques  \par \par - Behavioral strategies:\par NO screen time 30-45 mins before bed\par Bedroom only for sleep and sex\par NO napping more than 30-45 mins daytime and not after 3 pm... \par Exercise routinely ideally 30-40 mins moderate exercise 4-6 x/ wk, not right before bed\par Spend at least 15-20 mins outdoors\par Establish relaxing night time ritual\par Go to bed/ wake up at the same time as often as possible\par Don't stay in bed if you can't sleep more than 30 mins... get up and do something quiet, relaxing (not electronic)\par \par - reassurance patience, multiple issues over short period of time. Reviewed FM and risk given trauma of past few mnths and hx of preexisting IBS, migraines, ADHD... but at this time- does NOT meet criteria with minimal arthralgias, myalgias.. and polysomatic sx fairly well controlled. \par \par - rto 2 m now.. sooner if any new sx arise. \par \par - call for results of labs.

## 2021-07-21 NOTE — ASSESSMENT
[FreeTextEntry1] : 25 yo wf previously healthy with ys of Asthma, ADHD (on Adderall),  Migraines, IBS C/D (well controlled w/ gluten free diet).. was doing well till Covid infection 3/21.. followed by persistent severe chest pressure and lymphadenopathy- eventually dx w/ thyroid cancer.  Now s/p total thyroidectomy 6/21.. \par Pending radiation therapy. \par THyroid function stable throughout, no mnt hyperthyroid to minimize malignancy... \par Presents to rheum for persistent fatigue, diffuse myalgias, hair loss.. r/o AI disease. \par \par 1) Diffuse myalgias, fatigue (overwhelming) and dyscognition with hx migraines and IBS.  \par NRS- considerable issue w/ sleep over past few mnths, trouble falling/ staying asleep, rarely more than 3 hs consecutively unless hasn't slept in days then sleeping 12+ hours.  \par IBS: C/D:  controlled \par Migraines: worse prior to thyroidectomy with diffuse prominent lymphadenopathy R side, all much improved. \par ADHD:  stable, tried to hold adderall after thyroidectomy but fatigue too overwhelming\par Paresthesia: L arm numbness (not tingling).. in hand and arm/ radiating to entire LLE.  NO weakness, Working w/ neuro (Dr. Brothers) MRA neg for MS / CVA.. note prior to surgery, unclear if present before COVID...\par Alopecia: considerable past few mnth\par Rash:  migratory lesions x many ys...precise etiology unclear in past  \par ROS: + oral sicca, low grade temp   intermittent flulike sx several times/ yr lasting 1-3 days (misses work when active), NO raynauds (but hand pain in cold)\par NOTE: \par - failed melatonin in past\par - endoscopy/ colonoscopy 10 ys ago negative.. no evidence of inflammatory bowel disease. \par \par Discussion:  \par Hx suggestive of possible CTD/ AI condition but also consider dramatic changes in past few mnths- COVID infection followed by thyroid cancer with localized advanced lymphadenopathy..... \par Many of complaints preceed thyroid ca dx:  flulike sx, fatigue, myalgias, hair loss, rash.. but functionally doing well prior to COVID.  \par Will get rheum studies now but focus needs to be on attention to sleep, conditioning ... \par \par 2) Thyroid cancer:  total thyroidectomy and 25+ lymph nodes removed... vocal cord paralysis.. pending Radiation therapy... \par thyroid ca recently dx following COVID infection (pulm, loss taste/ smell, migraines x 30 days and brain fog) found when continued to feel poorly (Elias Karimi) ... eventually total thyroidectomy 6/1/21 over 25 lymph node dissection... scheduled to start radiation.. next few wks.. \par TFTs currently mnt at hyperthyroidism.. to minimize\par No palpitations despite high thyroid replacement but "can't fall asleep" \par \par 3) Asthma/ Covid Infection: 3/21 classic presentation most persistent since recovery, chronic fatigue and chest wall tightness... working w/ pulm... \par PFTS 5/21 obstructive w/ no ventilatory defect. Continues routine Dulera/ albuterol.. \par CTscan chest:  3/21 \par \par Plan:\par - Labs now.. full SLE serologies \par \par - would like to review CTscan (3/21- [facility])\par \par - Try  add Zinc 44 mg and Magnesium 200-400 mg nightly\par - Valerian root  300-600 mg\par - sleepy time tea\par - continue meditation, relaxation techniques  \par \par - Behavioral strategies:\par NO screen time 30-45 mins before bed\par Bedroom only for sleep and sex\par NO napping more than 30-45 mins daytime and not after 3 pm... \par Exercise routinely ideally 30-40 mins moderate exercise 4-6 x/ wk, not right before bed\par Spend at least 15-20 mins outdoors\par Establish relaxing night time ritual\par Go to bed/ wake up at the same time as often as possible\par Don't stay in bed if you can't sleep more than 30 mins... get up and do something quiet, relaxing (not electronic)\par \par - reassurance patience, multiple issues over short period of time. Reviewed FM and risk given trauma of past few mnths and hx of preexisting IBS, migraines, ADHD... but at this time- does NOT meet criteria with minimal arthralgias, myalgias.. and polysomatic sx fairly well controlled. \par \par - rto 2 m now.. sooner if any new sx arise. \par \par - call for results of labs. \par \par

## 2021-07-21 NOTE — HISTORY OF PRESENT ILLNESS
[FreeTextEntry1] : (Initial HPI 7/20/21) \par 27 yo wf previously healthy with no PMH then \par thyroid ca recently dx following COVID infection (pulm, loss taste/ smell, migraines x 30 days and brain fog) found when continued to feel poorly (Elias Karimi) ... eventually total thyroidectomy 6/1/21 over 25 lymph node dissection... scheduled to start radiation.. next few wks.. \par TFTs currently mnt at hyperthyroidism.. to minimize\par No palpitations\par Endo:  Dr. Alva\par Overwhelming fatigue, brain fog.. considerable.. \par Frustrated with sleep disorder.. \par Hair loss.. prior to sx.. TFTs were never a problem\par Known gluten intolerance:  IBS - C/D worse constipation/ bloating, nausea.  Better w/ gluten avoidance.. endoscopy/ colonoscopy 10 ys ago negative.. no evidence of inflammatory bowel disease. Doing well now \par Mild intermittent flulike sx associated w/ low grade fever and overwhelming fatigue then followed by L leg pain/ calf discomfort at HS (few times/ yr.. in past now mnthly).. not actually in joints- but bony\par Hand pain in winter only not actually joints- no Raynauds... \par Night sweats for past yr... drenching sweat... not better with thyroidectomy... \par Did have lymphadenopathy R cervical.. first noted 2 ys ago.. progressively larger with COVID  also noted R sided.\par Continues to c/o chest tightness, sob better but minimal [place].. Holter negative.. last CTscan chest 3/21 \par L arm numbness (not tingling).. in hand and arm/ radiating to entire LLE.  Working w/ neuro (Dr. Brothers) MRA neg for MS / CVA.. note prior to surgery, unclear if present before COVID... \par FUnctionally still fairly limited.. tolerance at this point\par  for deaf/ blind.. working till sx..   \par \par ROS:  + low grade fevers, + night sweats, wt loss 2-4 lbs w/ nl appetite , lymphadenopathy- R cervical x 2 ys and throughout R side face- now resolved following sx, NO arthropathy,+  rash, + alopecia, neg raynauds, + headaches:  progressively worse since events above, migraines worse in past w/ menses most severe past yr.. COVID terrible, NO  vision loss,  mild oral sicca preop,  NO mucositis, NO serositis\par NO bleeding/ clotting dyscrasias or cytopenias, renal or hepatic dysfunction. \par No pregnancy, \par \par \par \par \par Skin in past contact dermatitis.. recurrent for past 2 ys.. never able to id ppt event... minimal recently.. \par \par + FH mother Graves disease..

## 2021-07-21 NOTE — REVIEW OF SYSTEMS
[Feeling Tired] : feeling tired [Heart Rate Is Fast] : fast heart rate [Chest Pain] : chest pain [Palpitations] : palpitations [Cough] : cough [Constipation] : constipation [Diarrhea] : diarrhea [As Noted in HPI] : as noted in HPI [Skin Lesions] : skin lesion [Negative] : Heme/Lymph [FreeTextEntry2] : night sweats routinely- few lbs wt loss, appetite good [FreeTextEntry6] : chest tightness  [FreeTextEntry9] : myalgias  [de-identified] : multiple

## 2021-07-21 NOTE — PHYSICAL EXAM
[General Appearance - Alert] : alert [General Appearance - In No Acute Distress] : in no acute distress [General Appearance - Well Nourished] : well nourished [General Appearance - Well Developed] : well developed [Sclera] : the sclera and conjunctiva were normal [PERRL With Normal Accommodation] : pupils were equal in size, round, and reactive to light [Extraocular Movements] : extraocular movements were intact [Outer Ear] : the ears and nose were normal in appearance [Oropharynx] : the oropharynx was normal [Neck Appearance] : the appearance of the neck was normal [Neck Cervical Mass (___cm)] : no neck mass was observed [Jugular Venous Distention Increased] : there was no jugular-venous distention [Thyroid Diffuse Enlargement] : the thyroid was not enlarged [Thyroid Nodule] : there were no palpable thyroid nodules [Auscultation Breath Sounds / Voice Sounds] : lungs were clear to auscultation bilaterally [Heart Rate And Rhythm] : heart rate was normal and rhythm regular [Heart Sounds] : normal S1 and S2 [Heart Sounds Gallop] : no gallops [Murmurs] : no murmurs [Heart Sounds Pericardial Friction Rub] : no pericardial rub [Full Pulse] : the pedal pulses are present [Edema] : there was no peripheral edema [Bowel Sounds] : normal bowel sounds [Abdomen Soft] : soft [Abdomen Tenderness] : non-tender [] : no hepato-splenomegaly [Abdomen Mass (___ Cm)] : no abdominal mass palpated [Cervical Lymph Nodes Enlarged Anterior Bilaterally] : anterior cervical [Cervical Lymph Nodes Enlarged Posterior Bilaterally] : posterior cervical [Supraclavicular Lymph Nodes Enlarged Bilaterally] : supraclavicular [Axillary Lymph Nodes Enlarged Bilaterally] : axillary [Femoral Lymph Nodes Enlarged Bilaterally] : femoral [Inguinal Lymph Nodes Enlarged Bilaterally] : inguinal [No CVA Tenderness] : no ~M costovertebral angle tenderness [No Spinal Tenderness] : no spinal tenderness [Abnormal Walk] : normal gait [Nail Clubbing] : no clubbing  or cyanosis of the fingernails [Musculoskeletal - Swelling] : no joint swelling seen [Motor Tone] : muscle strength and tone were normal [FreeTextEntry1] : no tender points Joint exam reveals no evidence of active synovitis. There were no flexion contractures or deformities  in any joints. All joints have full ROM  without warmth, erythema, effusion or tenderness. [Sensation] : the sensory exam was normal to light touch and pinprick [No Focal Deficits] : no focal deficits [Oriented To Time, Place, And Person] : oriented to person, place, and time [Impaired Insight] : insight and judgment were intact [Affect] : the affect was normal [Mood] : the mood was normal

## 2021-07-22 LAB
25(OH)D3 SERPL-MCNC: 47.1 NG/ML
ACE BLD-CCNC: 18 U/L
ALBUMIN SERPL ELPH-MCNC: 5.3 G/DL
ALP BLD-CCNC: 56 U/L
ALT SERPL-CCNC: 14 U/L
ANION GAP SERPL CALC-SCNC: 16 MMOL/L
APPEARANCE: CLEAR
AST SERPL-CCNC: 20 U/L
BASOPHILS # BLD AUTO: 0.11 K/UL
BASOPHILS NFR BLD AUTO: 1.4 %
BILIRUB SERPL-MCNC: 0.3 MG/DL
BILIRUBIN URINE: NEGATIVE
BLOOD URINE: NEGATIVE
BUN SERPL-MCNC: 6 MG/DL
C3 SERPL-MCNC: 120 MG/DL
C4 SERPL-MCNC: 24 MG/DL
CALCIUM SERPL-MCNC: 10 MG/DL
CALCIUM SERPL-MCNC: 10 MG/DL
CCP AB SER IA-ACNC: <8 UNITS
CHLORIDE SERPL-SCNC: 101 MMOL/L
CO2 SERPL-SCNC: 23 MMOL/L
COLOR: YELLOW
CREAT SERPL-MCNC: 0.82 MG/DL
CRP SERPL-MCNC: <3 MG/L
DSDNA AB SER-ACNC: <12 IU/ML
ENA RNP AB SER IA-ACNC: <0.2 AL
ENA SM AB SER IA-ACNC: <0.2 AL
ENA SS-A AB SER IA-ACNC: <0.2 AL
ENA SS-B AB SER IA-ACNC: <0.2 AL
EOSINOPHIL # BLD AUTO: 0.09 K/UL
EOSINOPHIL NFR BLD AUTO: 1.1 %
ERYTHROCYTE [SEDIMENTATION RATE] IN BLOOD BY WESTERGREN METHOD: 6 MM/HR
GLUCOSE QUALITATIVE U: NEGATIVE
GLUCOSE SERPL-MCNC: 87 MG/DL
HBV CORE IGG+IGM SER QL: NONREACTIVE
HBV SURFACE AB SER QL: REACTIVE
HBV SURFACE AG SER QL: NONREACTIVE
HCT VFR BLD CALC: 47.9 %
HCV AB SER QL: NONREACTIVE
HCV S/CO RATIO: 0.12 S/CO
HGB BLD-MCNC: 14.6 G/DL
IMM GRANULOCYTES NFR BLD AUTO: 0.4 %
KETONES URINE: NEGATIVE
LEUKOCYTE ESTERASE URINE: NEGATIVE
LYMPHOCYTES # BLD AUTO: 2.14 K/UL
LYMPHOCYTES NFR BLD AUTO: 26.3 %
MAN DIFF?: NORMAL
MCHC RBC-ENTMCNC: 26.3 PG
MCHC RBC-ENTMCNC: 30.5 GM/DL
MCV RBC AUTO: 86.2 FL
MONOCYTES # BLD AUTO: 0.49 K/UL
MONOCYTES NFR BLD AUTO: 6 %
NEUTROPHILS # BLD AUTO: 5.28 K/UL
NEUTROPHILS NFR BLD AUTO: 64.8 %
NITRITE URINE: NEGATIVE
PARATHYROID HORMONE INTACT: 33 PG/ML
PH URINE: 8
PLATELET # BLD AUTO: 459 K/UL
POTASSIUM SERPL-SCNC: 4.6 MMOL/L
PROT SERPL-MCNC: 7.5 G/DL
PROTEIN URINE: NEGATIVE
RBC # BLD: 5.56 M/UL
RBC # FLD: 13.9 %
RF+CCP IGG SER-IMP: NEGATIVE
RHEUMATOID FACT SER QL: <10 IU/ML
SODIUM SERPL-SCNC: 140 MMOL/L
SPECIFIC GRAVITY URINE: 1.01
TSH SERPL-ACNC: 0.79 UIU/ML
UROBILINOGEN URINE: NORMAL
WBC # FLD AUTO: 8.14 K/UL

## 2021-07-22 NOTE — ASSESSMENT
[FreeTextEntry1] : 26 year old female s/p COVID 19 virus infection, s/p recent thyroidectomy for thyroid cancer, adjuvant XRT presents for follow up, extreme fatigue, night sweats,  chest heaviness, vocal cord dysfunction presents for follow up\par \par Increase Dulera 200/5 2 puffs BID\par Home Sleep Study ordered\par CT chest \par \par Follow up pending studies

## 2021-07-22 NOTE — HISTORY OF PRESENT ILLNESS
[Never] : never [TextBox_4] : Patient is a 26 year old female Hx asthma s/p recent thyroidectomy presents for follow up.  Patient recovering fromthyroidectomy June.  She reports since surgery she has had difficulty breathing.  She describes symptoms as chest tightness.  Patient is anticipating beginning radiation treatment end month.  She has seen Rheumatology for symptoms of joint pains and night sweats.  She is suffering from hair loss since having COVID 19.  She reports she is extremely fatigued.

## 2021-07-22 NOTE — REASON FOR VISIT
[Follow-Up] : a follow-up visit [TextBox_44] : Thyroid cancer, s/p thyroidectomy, residual vocal cord dysfunction

## 2021-07-29 LAB
24R-OH-CALCIDIOL SERPL-MCNC: 64.1 PG/ML
ALBUMIN MFR SERPL ELPH: 64.3 %
ALBUMIN SERPL-MCNC: 5 G/DL
ALBUMIN/GLOB SERPL: 1.9 RATIO
ALPHA1 GLOB MFR SERPL ELPH: 4.1 %
ALPHA1 GLOB SERPL ELPH-MCNC: 0.3 G/DL
ALPHA2 GLOB MFR SERPL ELPH: 9.5 %
ALPHA2 GLOB SERPL ELPH-MCNC: 0.7 G/DL
B-GLOBULIN MFR SERPL ELPH: 10.8 %
B-GLOBULIN SERPL ELPH-MCNC: 0.8 G/DL
GAMMA GLOB FLD ELPH-MCNC: 0.9 G/DL
GAMMA GLOB MFR SERPL ELPH: 11.3 %
INTERPRETATION SERPL IEP-IMP: NORMAL
PROT SERPL-MCNC: 7.7 G/DL
PROT SERPL-MCNC: 7.7 G/DL

## 2021-08-05 ENCOUNTER — TRANSCRIPTION ENCOUNTER (OUTPATIENT)
Age: 26
End: 2021-08-05

## 2021-08-24 ENCOUNTER — OUTPATIENT (OUTPATIENT)
Dept: OUTPATIENT SERVICES | Facility: HOSPITAL | Age: 26
LOS: 1 days | End: 2021-08-24
Payer: COMMERCIAL

## 2021-08-24 ENCOUNTER — APPOINTMENT (OUTPATIENT)
Dept: MRI IMAGING | Facility: HOSPITAL | Age: 26
End: 2021-08-24
Payer: COMMERCIAL

## 2021-08-24 DIAGNOSIS — Z00.8 ENCOUNTER FOR OTHER GENERAL EXAMINATION: ICD-10-CM

## 2021-08-24 PROCEDURE — 73718 MRI LOWER EXTREMITY W/O DYE: CPT

## 2021-08-24 PROCEDURE — 73718 MRI LOWER EXTREMITY W/O DYE: CPT | Mod: 26,LT

## 2021-08-26 ENCOUNTER — APPOINTMENT (OUTPATIENT)
Dept: SLEEP CENTER | Facility: CLINIC | Age: 26
End: 2021-08-26
Payer: COMMERCIAL

## 2021-08-26 ENCOUNTER — OUTPATIENT (OUTPATIENT)
Dept: OUTPATIENT SERVICES | Facility: HOSPITAL | Age: 26
LOS: 1 days | End: 2021-08-26
Payer: COMMERCIAL

## 2021-08-26 PROCEDURE — 95806 SLEEP STUDY UNATT&RESP EFFT: CPT | Mod: 26

## 2021-08-26 PROCEDURE — 95806 SLEEP STUDY UNATT&RESP EFFT: CPT

## 2021-08-31 ENCOUNTER — NON-APPOINTMENT (OUTPATIENT)
Age: 26
End: 2021-08-31

## 2021-08-31 ENCOUNTER — TRANSCRIPTION ENCOUNTER (OUTPATIENT)
Age: 26
End: 2021-08-31

## 2021-08-31 DIAGNOSIS — G47.33 OBSTRUCTIVE SLEEP APNEA (ADULT) (PEDIATRIC): ICD-10-CM

## 2021-09-15 ENCOUNTER — APPOINTMENT (OUTPATIENT)
Dept: RHEUMATOLOGY | Facility: CLINIC | Age: 26
End: 2021-09-15

## 2021-09-24 ENCOUNTER — APPOINTMENT (OUTPATIENT)
Dept: RHEUMATOLOGY | Facility: CLINIC | Age: 26
End: 2021-09-24

## 2021-10-07 ENCOUNTER — APPOINTMENT (OUTPATIENT)
Dept: FAMILY MEDICINE | Facility: CLINIC | Age: 26
End: 2021-10-07
Payer: COMMERCIAL

## 2021-10-07 LAB — SARS-COV-2 N GENE NPH QL NAA+PROBE: NOT DETECTED

## 2021-10-07 PROCEDURE — 99215 OFFICE O/P EST HI 40 MIN: CPT

## 2021-10-19 VITALS
DIASTOLIC BLOOD PRESSURE: 78 MMHG | TEMPERATURE: 98.6 F | HEART RATE: 100 BPM | SYSTOLIC BLOOD PRESSURE: 128 MMHG | RESPIRATION RATE: 16 BRPM | OXYGEN SATURATION: 98 %

## 2021-10-19 NOTE — REVIEW OF SYSTEMS
[Fatigue] : fatigue [Hoarseness] : hoarseness [Nasal Discharge] : nasal discharge [Sore Throat] : sore throat [Postnasal Drip] : postnasal drip [Dyspnea on Exertion] : dyspnea on exertion [Abdominal Pain] : abdominal pain [Nausea] : nausea [Diarrhea] : diarrhea [Negative] : Psychiatric [Fever] : no fever [Chills] : no chills [Hot Flashes] : no hot flashes [Night Sweats] : no night sweats [Recent Change In Weight] : ~T no recent weight change [Earache] : no earache [Hearing Loss] : no hearing loss [Nosebleed] : no nosebleeds [Chest Pain] : no chest pain [Shortness Of Breath] : no shortness of breath [Wheezing] : no wheezing [Cough] : no cough [Constipation] : no constipation [Vomiting] : no vomiting [Heartburn] : no heartburn [Melena] : no melena

## 2021-10-19 NOTE — HISTORY OF PRESENT ILLNESS
[FreeTextEntry8] : 27 yo female with PMHx thyroid ca with resection, chemo/radiation, COVID, asthma, presents to the office for a sore throat and 'cold' symptoms. the patient reports having a negative COVID PCR test yesterday, states that she started to develop a sore throat, with a post nasal drip x three days. she has been using her inhalers as needed, but denies any chest pain/shortness of breath, or coughing. she denies any body aches, chills. she recently went back to work after being on medical leave, and thinks that's why she now has a 'cold'. she also reports abdominal pain s/p radioactive iodine administration. she was seen in the ED and discharged from the hospital. she reports experiencing diarrhea, and abdominal cramping.

## 2021-10-19 NOTE — PHYSICAL EXAM
[Normal Outer Ear/Nose] : the outer ears and nose were normal in appearance [Normal TMs] : both tympanic membranes were normal [Coordination Grossly Intact] : coordination grossly intact [No Focal Deficits] : no focal deficits [Normal Gait] : normal gait [Speech Grossly Normal] : speech grossly normal [Alert and Oriented x3] : oriented to person, place, and time [Normal Mood] : the mood was normal [Normal] : affect was normal and insight and judgment were intact [de-identified] : PND

## 2021-11-03 ENCOUNTER — APPOINTMENT (OUTPATIENT)
Dept: FAMILY MEDICINE | Facility: CLINIC | Age: 26
End: 2021-11-03
Payer: COMMERCIAL

## 2021-11-03 VITALS
WEIGHT: 121 LBS | DIASTOLIC BLOOD PRESSURE: 80 MMHG | SYSTOLIC BLOOD PRESSURE: 126 MMHG | OXYGEN SATURATION: 96 % | BODY MASS INDEX: 25.4 KG/M2 | RESPIRATION RATE: 16 BRPM | HEIGHT: 58 IN | TEMPERATURE: 99.3 F | HEART RATE: 102 BPM

## 2021-11-03 DIAGNOSIS — Z80.8 FAMILY HISTORY OF MALIGNANT NEOPLASM OF OTHER ORGANS OR SYSTEMS: ICD-10-CM

## 2021-11-03 PROCEDURE — 99395 PREV VISIT EST AGE 18-39: CPT | Mod: 25

## 2021-11-07 PROBLEM — Z80.8 FAMILY HISTORY OF MALIGNANT NEOPLASM OF SKIN: Status: ACTIVE | Noted: 2021-11-03

## 2021-11-07 NOTE — HISTORY OF PRESENT ILLNESS
[FreeTextEntry1] : CPE [de-identified] : 25 yo female with PMHx thyroid ca with resection, chemo/radiation, COVID, asthma, for CPE\par overall is feeling well \par endo: doreen eden \par ent: neville mayer \par seeing a therapist since being diagnosed with thyroid cancer this past year \par denies any depression sx \par denies any other complaints or concerns at this time \par

## 2021-11-07 NOTE — ASSESSMENT
[FreeTextEntry1] : Routine medical examination\par VSS- exam normal \par c/w current medications\par Advised healthy diet and exercise\par will follow up labs \par patient verbalizes understanding and patient is stable upon discharge\par

## 2021-11-07 NOTE — PHYSICAL EXAM
[No Acute Distress] : no acute distress [Well Nourished] : well nourished [Well Developed] : well developed [Well-Appearing] : well-appearing [Normal Sclera/Conjunctiva] : normal sclera/conjunctiva [PERRL] : pupils equal round and reactive to light [EOMI] : extraocular movements intact [Normal Outer Ear/Nose] : the outer ears and nose were normal in appearance [Normal Oropharynx] : the oropharynx was normal [No JVD] : no jugular venous distention [No Lymphadenopathy] : no lymphadenopathy [Supple] : supple [No Respiratory Distress] : no respiratory distress  [No Accessory Muscle Use] : no accessory muscle use [Clear to Auscultation] : lungs were clear to auscultation bilaterally [Normal Rate] : normal rate  [Regular Rhythm] : with a regular rhythm [Normal S1, S2] : normal S1 and S2 [No Murmur] : no murmur heard [No Carotid Bruits] : no carotid bruits [No Abdominal Bruit] : a ~M bruit was not heard ~T in the abdomen [No Varicosities] : no varicosities [Pedal Pulses Present] : the pedal pulses are present [No Edema] : there was no peripheral edema [No Palpable Aorta] : no palpable aorta [No Extremity Clubbing/Cyanosis] : no extremity clubbing/cyanosis [Soft] : abdomen soft [Non Tender] : non-tender [Non-distended] : non-distended [No Masses] : no abdominal mass palpated [No HSM] : no HSM [Normal Bowel Sounds] : normal bowel sounds [Normal Posterior Cervical Nodes] : no posterior cervical lymphadenopathy [Normal Anterior Cervical Nodes] : no anterior cervical lymphadenopathy [No CVA Tenderness] : no CVA  tenderness [No Spinal Tenderness] : no spinal tenderness [No Joint Swelling] : no joint swelling [Grossly Normal Strength/Tone] : grossly normal strength/tone [No Rash] : no rash [Coordination Grossly Intact] : coordination grossly intact [No Focal Deficits] : no focal deficits [Normal Gait] : normal gait [Deep Tendon Reflexes (DTR)] : deep tendon reflexes were 2+ and symmetric [Normal Affect] : the affect was normal [Normal Insight/Judgement] : insight and judgment were intact [de-identified] : + thyroid resection scar

## 2021-11-07 NOTE — HEALTH RISK ASSESSMENT
[Good] : ~his/her~  mood as  good [Monthly or less (1 pt)] : Monthly or less (1 point) [1 or 2 (0 pts)] : 1 or 2 (0 points) [Never (0 pts)] : Never (0 points) [No] : In the past 12 months have you used drugs other than those required for medical reasons? No [No falls in past year] : Patient reported no falls in the past year [0] : 2) Feeling down, depressed, or hopeless: Not at all (0) [PHQ-2 Negative - No further assessment needed] : PHQ-2 Negative - No further assessment needed [Patient reported mammogram was normal] : Patient reported mammogram was normal [Patient reported PAP Smear was normal] : Patient reported PAP Smear was normal [HIV test declined] : HIV test declined [Hepatitis C test declined] : Hepatitis C test declined [Alone] : lives alone [Employed] : employed [Sexually Active] : sexually active [Fully functional (bathing, dressing, toileting, transferring, walking, feeding)] : Fully functional (bathing, dressing, toileting, transferring, walking, feeding) [Fully functional (using the telephone, shopping, preparing meals, housekeeping, doing laundry, using] : Fully functional and needs no help or supervision to perform IADLs (using the telephone, shopping, preparing meals, housekeeping, doing laundry, using transportation, managing medications and managing finances) [FreeTextEntry1] : thyroid cancer; asthma on dulera (seeing Pulm)  [] : No [de-identified] : endo, head and neck, pulm  [Audit-CScore] : 0 [de-identified] : less since post surgery  [[id number]] : 0 [Change in mental status noted] : No change in mental status noted [High Risk Behavior] : no high risk behavior [Reports changes in hearing] : Reports no changes in hearing [Reports changes in vision] : Reports no changes in vision [MammogramComments] : has left breast u/s on 11/2/2021- pending  [PapSmearComments] : Stephanie Robles [PapSmearDate] : 2020 [FreeTextEntry2] :  [employer]

## 2021-11-08 ENCOUNTER — APPOINTMENT (OUTPATIENT)
Dept: FAMILY MEDICINE | Facility: CLINIC | Age: 26
End: 2021-11-08

## 2021-11-10 ENCOUNTER — APPOINTMENT (OUTPATIENT)
Dept: GASTROENTEROLOGY | Facility: CLINIC | Age: 26
End: 2021-11-10
Payer: COMMERCIAL

## 2021-11-10 VITALS
WEIGHT: 120 LBS | SYSTOLIC BLOOD PRESSURE: 136 MMHG | HEART RATE: 84 BPM | HEIGHT: 58 IN | DIASTOLIC BLOOD PRESSURE: 85 MMHG | TEMPERATURE: 98.7 F | OXYGEN SATURATION: 100 % | BODY MASS INDEX: 25.19 KG/M2

## 2021-11-10 PROCEDURE — 99204 OFFICE O/P NEW MOD 45 MIN: CPT

## 2021-11-10 NOTE — HISTORY OF PRESENT ILLNESS
[de-identified] : EL AMBROCIO is a 26 year old female with a history of thyroid cancer status post radioactive iodine, chemo, resection, Covid infection, asthma on Dulera, chronic GI issues\par \par She is presenting today for evaluation of chronic bloating, abdominal discomfort, and waterbrash.  The patient reports that for over 10 years, she has had GI discomfort.  10 years prior she was going to pediatric gastroenterologist and underwent an EGD and colonoscopy.  She does not know the results of these but thinks they were okay.  She did say she was diagnosed with gluten intolerance and has been avoiding gluten, lactose, and adhering to a low FODMAP diet for this period of time.  She states she was doing okay and her symptoms were manageable until the thyroid treatment, after which she has been noticing increased bloating where she reports she looks 9 months pregnant, as well as abdominal discomfort after eating and a bad taste in her mouth.  She is requesting an endoscopy because she is worried that something could be going on.  The only medication that she takes for the symptoms is a women's probiotic.\par \par The patient denies weight loss, fevers, chills, change in bowel habits (she has regular formed brown stools) though notes rare bright red blood per rectum on wiping that she attributes to hemorrhoids.  She has no family history of GI disease.  She does report occasional palpitations likely related to her Synthroid dosing. She had labs in July of this year which were unremarkable.\par \par \par \par

## 2021-11-10 NOTE — ASSESSMENT
[FreeTextEntry1] : EL AMBROCIO is a 26 year old female with a history of thyroid cancer status post radioactive iodine, chemo, resection, Covid infection, asthma on Dulera, here for evaluation of chronic abdominal bloating and discomfort as well as heartburn.\par \par #Chronic bloating, abdominal discomfort -the patient does not have any red flag symptoms and the symptoms have been on and off present for over a decade\par #Waterbrash–discussed this could be a manifestation of acid reflux\par \par Recs:\par -Talked about most likely diagnosis of functional dyspepsia/IBS and discussed a step up approach to management with the least invasive and non-prescription empiric trials first\par -We will start with over-the-counter medications to try to ease her symptoms including probiotics (VSL #3), peppermint oil, Pepcid\par -Sent information to the patient about the low FODMAP diet and advised to continue avoiding lactose and sucrose as well as gluten if she notices symptoms with gluten-containing products\par -We will obtain an abdominal ultrasound to rule out biliary source of discomfort and bloating\par -Return to clinic in 1 month\par -If she continues to be symptomatic, will consider repeat labs, endoscopy, SIBO testing, prescription medication, etc.

## 2021-11-10 NOTE — PHYSICAL EXAM
[General Appearance - Alert] : alert [General Appearance - In No Acute Distress] : in no acute distress [Sclera] : the sclera and conjunctiva were normal [Outer Ear] : the ears and nose were normal in appearance [Neck Appearance] : the appearance of the neck was normal [FreeTextEntry1] : transverse thyroidectomy scar [Auscultation Breath Sounds / Voice Sounds] : lungs were clear to auscultation bilaterally [Heart Rate And Rhythm] : heart rate was normal and rhythm regular [Heart Sounds] : normal S1 and S2 [Heart Sounds Gallop] : no gallops [Murmurs] : no murmurs [Heart Sounds Pericardial Friction Rub] : no pericardial rub [Bowel Sounds] : normal bowel sounds [Abdomen Soft] : soft [Abdomen Tenderness] : non-tender [Abdomen Mass (___ Cm)] : no abdominal mass palpated [Patient Refused] : rectal exam was refused by the patient [Involuntary Movements] : no involuntary movements were seen [Skin Color & Pigmentation] : normal skin color and pigmentation [Skin Turgor] : normal skin turgor [] : no rash [No Focal Deficits] : no focal deficits [Oriented To Time, Place, And Person] : oriented to person, place, and time [Impaired Insight] : insight and judgment were intact [Affect] : the affect was normal

## 2021-11-22 ENCOUNTER — TRANSCRIPTION ENCOUNTER (OUTPATIENT)
Age: 26
End: 2021-11-22

## 2021-11-24 ENCOUNTER — APPOINTMENT (OUTPATIENT)
Dept: PULMONOLOGY | Facility: CLINIC | Age: 26
End: 2021-11-24
Payer: COMMERCIAL

## 2021-11-24 VITALS — BODY MASS INDEX: 25.19 KG/M2 | HEIGHT: 58 IN | WEIGHT: 120 LBS

## 2021-11-24 PROCEDURE — 99212 OFFICE O/P EST SF 10 MIN: CPT | Mod: 95

## 2021-11-24 NOTE — ASSESSMENT
[FreeTextEntry1] : 26 year old female Hx mild persistent asthma s/p thyroidectomy for malignant neoplasm thyroid, residual  vocal cord dysfunction, radioactive iodine treatment, presents for follow up now with acute URI \par \par Prednisone 10 mg daily x 7 days\par Doxycycline 100 mg BID x 10 days\par Continue Dulera 200-5 2 puffs twice daily\par Continue Albuterol/Ipratropium via nebulizer every 4-6 hours\par \par Follow up 2 weeks

## 2021-11-24 NOTE — HISTORY OF PRESENT ILLNESS
[Home] : at home, [unfilled] , at the time of the visit. [Medical Office: ([facility])___] : at the medical office located in  [Verbal consent obtained from patient] : the patient, [unfilled] [Never] : never [TextBox_4] : Patient is a 26 year old female Hx asthma, thyroid cancer,  s/p thyroidectomy residual vocal cord dysfunction, recent treatment iodine, presents for symptoms shortness of breath, chest tightness and wheeze.  Patient reports this is her second URI over the past  three months. Patient recovering from thyroidectomy June.  She had radioactive iodine treatment which patient reports "did not go well."  She reports extreme fatigue and body aches which she has been experiencing since having COVID.  She is here for these symptoms.  Patient denies chest pain, fever, hemoptysis or systemic complaints.

## 2021-12-02 ENCOUNTER — APPOINTMENT (OUTPATIENT)
Dept: FAMILY MEDICINE | Facility: CLINIC | Age: 26
End: 2021-12-02
Payer: COMMERCIAL

## 2021-12-02 VITALS
DIASTOLIC BLOOD PRESSURE: 92 MMHG | SYSTOLIC BLOOD PRESSURE: 136 MMHG | OXYGEN SATURATION: 97 % | HEART RATE: 87 BPM | RESPIRATION RATE: 16 BRPM | TEMPERATURE: 98.6 F

## 2021-12-02 PROCEDURE — 99214 OFFICE O/P EST MOD 30 MIN: CPT

## 2021-12-07 ENCOUNTER — NON-APPOINTMENT (OUTPATIENT)
Age: 26
End: 2021-12-07

## 2021-12-07 NOTE — PHYSICAL EXAM
[No Acute Distress] : no acute distress [Well Nourished] : well nourished [Well Developed] : well developed [Well-Appearing] : well-appearing [Normal Sclera/Conjunctiva] : normal sclera/conjunctiva [PERRL] : pupils equal round and reactive to light [EOMI] : extraocular movements intact [Normal Outer Ear/Nose] : the outer ears and nose were normal in appearance [Normal Oropharynx] : the oropharynx was normal [No JVD] : no jugular venous distention [No Lymphadenopathy] : no lymphadenopathy [Supple] : supple [No Respiratory Distress] : no respiratory distress  [No Accessory Muscle Use] : no accessory muscle use [Clear to Auscultation] : lungs were clear to auscultation bilaterally [Normal Rate] : normal rate  [Regular Rhythm] : with a regular rhythm [Normal S1, S2] : normal S1 and S2 [No Murmur] : no murmur heard [No Carotid Bruits] : no carotid bruits [No Abdominal Bruit] : a ~M bruit was not heard ~T in the abdomen [No Varicosities] : no varicosities [Pedal Pulses Present] : the pedal pulses are present [No Edema] : there was no peripheral edema [No Palpable Aorta] : no palpable aorta [No Extremity Clubbing/Cyanosis] : no extremity clubbing/cyanosis [Soft] : abdomen soft [Non Tender] : non-tender [Non-distended] : non-distended [No Masses] : no abdominal mass palpated [No HSM] : no HSM [Normal Bowel Sounds] : normal bowel sounds [Normal Posterior Cervical Nodes] : no posterior cervical lymphadenopathy [Normal Anterior Cervical Nodes] : no anterior cervical lymphadenopathy [No CVA Tenderness] : no CVA  tenderness [No Spinal Tenderness] : no spinal tenderness [No Joint Swelling] : no joint swelling [Grossly Normal Strength/Tone] : grossly normal strength/tone [No Rash] : no rash [Coordination Grossly Intact] : coordination grossly intact [No Focal Deficits] : no focal deficits [Normal Gait] : normal gait [Deep Tendon Reflexes (DTR)] : deep tendon reflexes were 2+ and symmetric [Normal Affect] : the affect was normal [Normal Insight/Judgement] : insight and judgment were intact [de-identified] : + thyroid resection scar

## 2021-12-07 NOTE — REVIEW OF SYSTEMS
[Fatigue] : fatigue [Night Sweats] : night sweats [Hoarseness] : hoarseness [Negative] : Heme/Lymph [Fever] : no fever [Chills] : no chills [Hot Flashes] : no hot flashes [Recent Change In Weight] : ~T no recent weight change

## 2021-12-07 NOTE — HISTORY OF PRESENT ILLNESS
[FreeTextEntry8] : 27 yo female with PMHx thyroid ca with resection, chemo/radiation, COVID, asthma, for fatigue\par feeling very tired and exhausted \par feeling like body is aching \par brain fog \par feeling voice is raspy \par 2 weeks ago had a cold- pulm put on doxy and prednisone (around thanksgiving)\par will being pulm next week for follow up  \par recurrent night sweats \par cardio has not seen in a year \par patient states that she is feeling how she felt prior to being diagnosed with cancer and feels worried that something similar may be going on again \par denies any weight loss or change in menses \par denies any change in appetite \par will be seeing pulm, gi, ent, and endo within the next 2 weeks for follow up\par denies any depression sx- speaking to therapist \par denies any other complaints or concerns at this time

## 2021-12-08 ENCOUNTER — APPOINTMENT (OUTPATIENT)
Dept: GASTROENTEROLOGY | Facility: CLINIC | Age: 26
End: 2021-12-08
Payer: COMMERCIAL

## 2021-12-08 VITALS
OXYGEN SATURATION: 98 % | WEIGHT: 123 LBS | BODY MASS INDEX: 25.82 KG/M2 | TEMPERATURE: 98.7 F | DIASTOLIC BLOOD PRESSURE: 83 MMHG | SYSTOLIC BLOOD PRESSURE: 127 MMHG | HEIGHT: 58 IN | HEART RATE: 84 BPM

## 2021-12-08 PROCEDURE — 99214 OFFICE O/P EST MOD 30 MIN: CPT

## 2021-12-08 NOTE — ASSESSMENT
[FreeTextEntry1] : EL AMBROCIO is a 26 year old female with a history of thyroid cancer status post radioactive iodine, chemo, resection, Covid infection, asthma on Dulera, chronic GI issues\par \par #Likely IBS with bloating, abdominal discomfort\par #History of thyroid cancer status post radioactive iodine chemo and surgery\par \par Recs:\par -Discussed that patient had a partial response to FDGard in terms of improving her reflux symptoms, however her abdominal discomfort, bloating, and inability to eat remains\par -Discussed moving forward with some diagnostic procedures such as EGD EUS and possibly SIBO testing depending on the results of the endoscopies\par - Benefits and risks of the procedure (including but not limited to pain, infection, bleeding, perforation, injury to internal organs, missed lesions, IV site problems, risks of anesthesia, possible need for further procedures including emergency surgery) were explained to the patient. Alternatives to the procedure were discussed. The patient was agreeable to proceed.\par - The patient was instructed on the colonoscopy prep, including low fiber diet x 1 week, clear liquid diet x 1 day prior to the procedure, NPO two hours prior to the procedure, and instructions for use of the prescribed prep itself.\par -Discussed that there are not great medications for IBS, but if we can provide reassurance that there is no organic etiology of her symptoms, learn how to manage her symptoms, and treat them as best as we can, these are usually effective managing IBS.  Discussed trialing twice daily oil of oregano for bloating and considering initiation of PPI or TCA for IBS; patient will think about this and maybe consider starting after the procedures are done

## 2021-12-08 NOTE — HISTORY OF PRESENT ILLNESS
[de-identified] : EL AMBROCIO is a 26 year old female with a history of thyroid cancer status post radioactive iodine, chemo, resection, Covid infection, asthma on Dulera, chronic GI issues\par \par She is presenting today for follow-up of abdominal discomfort and bloating.  Since her last visit, the patient has been taking FD Laurence which has helped with some of her reflux, and has been adhering to a low FODMAP diet, has been taking peppermint tea as well as her probiotics.  However she still reports generalized abdominal discomfort and bloating.  She is only eating a small amount every day.  When she ate more than usual Thanksgiving, she had severe abdominal pain.  Her stools are still regular and formed.\par \par The patient is concerned that these are post radioactive iodine induced changes or something worse.\par \par \par \par

## 2021-12-13 ENCOUNTER — APPOINTMENT (OUTPATIENT)
Dept: PULMONOLOGY | Facility: CLINIC | Age: 26
End: 2021-12-13
Payer: COMMERCIAL

## 2021-12-13 VITALS
OXYGEN SATURATION: 98 % | TEMPERATURE: 98 F | HEIGHT: 58 IN | RESPIRATION RATE: 16 BRPM | BODY MASS INDEX: 25.82 KG/M2 | WEIGHT: 123 LBS | DIASTOLIC BLOOD PRESSURE: 70 MMHG | SYSTOLIC BLOOD PRESSURE: 131 MMHG | HEART RATE: 74 BPM

## 2021-12-13 PROCEDURE — 99214 OFFICE O/P EST MOD 30 MIN: CPT

## 2021-12-13 NOTE — PHYSICAL EXAM
[No Acute Distress] : no acute distress [Oriented x3] : oriented x3 [Normal Oropharynx] : normal oropharynx [No Neck Mass] : no neck mass [Normal Rate/Rhythm] : normal rate/rhythm [Normal S1, S2] : normal s1, s2 [No Murmurs] : no murmurs [No Resp Distress] : no resp distress [Clear to Auscultation Bilaterally] : clear to auscultation bilaterally [No Abnormalities] : no abnormalities [Benign] : benign [Normal Gait] : normal gait [No Clubbing] : no clubbing [No Cyanosis] : no cyanosis [No Edema] : no edema [FROM] : FROM [Normal Color/ Pigmentation] : normal color/ pigmentation [No Focal Deficits] : no focal deficits [Normal Affect] : normal affect [TextBox_11] : R [TextBox_44] : Right anterior neck scar

## 2021-12-13 NOTE — ASSESSMENT
[FreeTextEntry1] : 26 year old female Hx mild persistent asthma, s/p thyroidectomy for malignant neoplasm thyroid, s/p iodine tratment presents for follow up feeling better\par \par Continue Dulera\par Asthma, Hypersensitivity panel ordered \par Patient to provide chest disc\par \par Follow up pending lab studies\par \par

## 2021-12-13 NOTE — HISTORY OF PRESENT ILLNESS
[Never] : never [TextBox_4] : Patient is a 26 year old female Hx asthma, thyroid cancer,  s/p thyroidectomy residual vocal cord dysfunction, recent treatment iodine, presents for follow up recent treatment acute bronchitis  Patient reports this is her second URI over the past  three months. Patient recovering from thyroidectomy June.  She had radioactive iodine treatment which patient reports "did not go well."  She reports extreme fatigue and body aches which she has been experiencing since having COVID.  She is here for these symptoms.  Patient denies chest pain, fever, hemoptysis or systemic complaints.

## 2021-12-17 LAB
25(OH)D3 SERPL-MCNC: 29.1 NG/ML
ALBUMIN SERPL ELPH-MCNC: 4.8 G/DL
ALP BLD-CCNC: 46 U/L
ALT SERPL-CCNC: 12 U/L
ANA SER IF-ACNC: NEGATIVE
ANION GAP SERPL CALC-SCNC: 17 MMOL/L
AST SERPL-CCNC: 11 U/L
BASOPHILS # BLD AUTO: 0.11 K/UL
BASOPHILS NFR BLD AUTO: 0.9 %
BILIRUB DIRECT SERPL-MCNC: 0.1 MG/DL
BILIRUB INDIRECT SERPL-MCNC: NORMAL MG/DL
BILIRUB SERPL-MCNC: 0.2 MG/DL
BUN SERPL-MCNC: 16 MG/DL
CALCIUM SERPL-MCNC: 10.4 MG/DL
CHLORIDE SERPL-SCNC: 101 MMOL/L
CHOLEST SERPL-MCNC: 235 MG/DL
CO2 SERPL-SCNC: 19 MMOL/L
CREAT SERPL-MCNC: 0.75 MG/DL
CRP SERPL-MCNC: <3 MG/L
EOSINOPHIL # BLD AUTO: 0.22 K/UL
EOSINOPHIL NFR BLD AUTO: 1.8 %
ERYTHROCYTE [SEDIMENTATION RATE] IN BLOOD BY WESTERGREN METHOD: 14 MM/HR
ESTIMATED AVERAGE GLUCOSE: 108 MG/DL
FOLATE SERPL-MCNC: 5.3 NG/ML
GLUCOSE SERPL-MCNC: 91 MG/DL
HBA1C MFR BLD HPLC: 5.4 %
HCT VFR BLD CALC: 38.3 %
HDLC SERPL-MCNC: 95 MG/DL
HGB BLD-MCNC: 12 G/DL
IMM GRANULOCYTES NFR BLD AUTO: 0.7 %
LDLC SERPL CALC-MCNC: 109 MG/DL
LYMPHOCYTES # BLD AUTO: 2.97 K/UL
LYMPHOCYTES NFR BLD AUTO: 24.3 %
MAGNESIUM SERPL-MCNC: 1.9 MG/DL
MAN DIFF?: NORMAL
MCHC RBC-ENTMCNC: 24.4 PG
MCHC RBC-ENTMCNC: 31.3 GM/DL
MCV RBC AUTO: 78 FL
MONOCYTES # BLD AUTO: 0.88 K/UL
MONOCYTES NFR BLD AUTO: 7.2 %
NEUTROPHILS # BLD AUTO: 7.96 K/UL
NEUTROPHILS NFR BLD AUTO: 65.1 %
NONHDLC SERPL-MCNC: 140 MG/DL
PLATELET # BLD AUTO: 468 K/UL
POTASSIUM SERPL-SCNC: 4.3 MMOL/L
PROT SERPL-MCNC: 6.7 G/DL
RBC # BLD: 4.91 M/UL
RBC # FLD: 13.7 %
RHEUMATOID FACT SER QL: 10 IU/ML
SODIUM SERPL-SCNC: 138 MMOL/L
TRIGL SERPL-MCNC: 153 MG/DL
VIT B12 SERPL-MCNC: 495 PG/ML
WBC # FLD AUTO: 12.22 K/UL

## 2022-01-06 ENCOUNTER — APPOINTMENT (OUTPATIENT)
Dept: GASTROENTEROLOGY | Facility: HOSPITAL | Age: 27
End: 2022-01-06

## 2022-01-10 ENCOUNTER — APPOINTMENT (OUTPATIENT)
Dept: FAMILY MEDICINE | Facility: CLINIC | Age: 27
End: 2022-01-10

## 2022-01-10 ENCOUNTER — TRANSCRIPTION ENCOUNTER (OUTPATIENT)
Age: 27
End: 2022-01-10

## 2022-01-13 ENCOUNTER — APPOINTMENT (OUTPATIENT)
Dept: MRI IMAGING | Facility: CLINIC | Age: 27
End: 2022-01-13

## 2022-01-18 ENCOUNTER — APPOINTMENT (OUTPATIENT)
Dept: CARDIOLOGY | Facility: CLINIC | Age: 27
End: 2022-01-18
Payer: COMMERCIAL

## 2022-01-18 ENCOUNTER — NON-APPOINTMENT (OUTPATIENT)
Age: 27
End: 2022-01-18

## 2022-01-18 ENCOUNTER — APPOINTMENT (OUTPATIENT)
Dept: CARDIOLOGY | Facility: CLINIC | Age: 27
End: 2022-01-18

## 2022-01-18 VITALS
RESPIRATION RATE: 15 BRPM | SYSTOLIC BLOOD PRESSURE: 125 MMHG | HEIGHT: 58 IN | TEMPERATURE: 98 F | BODY MASS INDEX: 24.56 KG/M2 | WEIGHT: 117 LBS | OXYGEN SATURATION: 100 % | HEART RATE: 87 BPM | DIASTOLIC BLOOD PRESSURE: 78 MMHG

## 2022-01-18 PROCEDURE — 93000 ELECTROCARDIOGRAM COMPLETE: CPT

## 2022-01-18 PROCEDURE — ZZZZZ: CPT

## 2022-01-18 PROCEDURE — 99215 OFFICE O/P EST HI 40 MIN: CPT

## 2022-01-18 RX ORDER — SODIUM SULFATE, POTASSIUM SULFATE, MAGNESIUM SULFATE 17.5; 3.13; 1.6 G/ML; G/ML; G/ML
17.5-3.13-1.6 SOLUTION, CONCENTRATE ORAL
Qty: 1 | Refills: 0 | Status: COMPLETED | COMMUNITY
Start: 2021-12-08 | End: 2022-01-18

## 2022-01-18 RX ORDER — LEVOTHYROXINE SODIUM 100 UG/1
100 CAPSULE ORAL
Refills: 0 | Status: COMPLETED | COMMUNITY
End: 2022-01-18

## 2022-01-18 RX ORDER — DEXTROAMPHETAMINE SACCHARATE, AMPHETAMINE ASPARTATE MONOHYDRATE, DEXTROAMPHETAMINE SULFATE AND AMPHETAMINE SULFATE 6.25; 6.25; 6.25; 6.25 MG/1; MG/1; MG/1; MG/1
25 CAPSULE, EXTENDED RELEASE ORAL
Qty: 30 | Refills: 0 | Status: COMPLETED | COMMUNITY
Start: 2018-03-28 | End: 2022-01-18

## 2022-01-18 RX ORDER — PREDNISONE 10 MG/1
10 TABLET ORAL DAILY
Qty: 14 | Refills: 1 | Status: COMPLETED | COMMUNITY
Start: 2021-11-24 | End: 2022-01-18

## 2022-01-18 RX ORDER — ONDANSETRON 8 MG/1
8 TABLET, ORALLY DISINTEGRATING ORAL
Qty: 28 | Refills: 0 | Status: COMPLETED | COMMUNITY
Start: 2021-09-16 | End: 2022-01-18

## 2022-01-18 RX ORDER — ONDANSETRON 4 MG/1
4 TABLET, ORALLY DISINTEGRATING ORAL
Qty: 4 | Refills: 0 | Status: COMPLETED | COMMUNITY
Start: 2021-12-08 | End: 2022-01-18

## 2022-01-18 RX ORDER — MOMETASONE FUROATE AND FORMOTEROL FUMARATE DIHYDRATE 200; 5 UG/1; UG/1
200-5 AEROSOL RESPIRATORY (INHALATION)
Qty: 1 | Refills: 2 | Status: COMPLETED | COMMUNITY
Start: 2021-07-21 | End: 2022-01-18

## 2022-01-18 RX ORDER — MOMETASONE FUROATE AND FORMOTEROL FUMARATE DIHYDRATE 100; 5 UG/1; UG/1
100-5 AEROSOL RESPIRATORY (INHALATION)
Qty: 1 | Refills: 1 | Status: COMPLETED | COMMUNITY
Start: 2022-01-11 | End: 2022-01-18

## 2022-01-18 NOTE — REASON FOR VISIT
[Consultation] : a consultation regarding [Palpitations] : palpitations [CV Risk Factors and Non-Cardiac Disease] : CV risk factors and non-cardiac disease [Other: ____] : [unfilled] [FreeTextEntry1] : murmur

## 2022-01-18 NOTE — DISCUSSION/SUMMARY
[FreeTextEntry1] : This is a 26-year-old female with past medical history significant for asthma, ADHD, gluten intolerance, diagnosed with thyroid cancer in 2021, status post thyroidectomy in June 2021, who comes in for cardiac evaluation.  She does complain of palpitations usually in the evening best described as a rapid heartbeat unrelated to any particular activity, and occasional sharp chest pain unrelated to any particular activity.\par She denies shortness of breath, dizziness, or syncope.  Her thyroid replacement dosage has been adjusted, most recently August 2021.\par She has no history of rheumatic fever.  She has no known cardiac risk factors.\par Electrocardiogram done January 10, 2022 demonstrate normal sinus rhythm rate of 87 bpm is otherwise unremarkable.\par  Pt reports the palpitations started in September after she received her radioactive iodine treatment.  She states the palpitations are worse at night and feel like her heart is beating out of her chest. At the time of these palpitations she states she usually checks her pulse ox which states her HR is in the 100's-110's.\par Labs from Dec 8th, 2021: TSH 0.8, T4 1.6, calcium 9.3\par Electrocardiogram done she is 2019 demonstrated normal sinus rhythm rate 90 beats per minute is otherwise unremarkable.   She is a .\par The patient will have a Holter monitor placed today to rule out significant arrhythmia or heart block.\par She will schedule an echo Doppler examination to rule out mitral valve prolapse, evaluate her left ventricular function, chamber size, and rule out hypertrophy.\par She is encouraged to increase her hydration.\par She will follow-up with me after above-noted diagnostic tests are completed.\par \par \par Thank you for allowing to participate in the care of your patient.  Please do not hesitate to call if you have any questions.

## 2022-01-18 NOTE — PHYSICAL EXAM
[General Appearance - Well Developed] : well developed [General Appearance - Well Nourished] : well nourished [Normal Oral Mucosa] : normal oral mucosa [Normal Oropharynx] : normal oropharynx [Normal Jugular Venous V Waves Present] : normal jugular venous V waves present [Normal Rate] : normal [Rhythm Regular] : regular [Normal S1] : normal S1 [Normal S2] : normal S2 [Click] : a ~M click was heard [No Pitting Edema] : no pitting edema present [Respiration, Rhythm And Depth] : normal respiratory rhythm and effort [Bowel Sounds] : normal bowel sounds [Abdomen Soft] : soft [Abnormal Walk] : normal gait [Nail Clubbing] : no clubbing of the fingernails [Cyanosis, Localized] : no localized cyanosis [Skin Color & Pigmentation] : normal skin color and pigmentation [Skin Turgor] : normal skin turgor [] : no rash [Oriented To Time, Place, And Person] : oriented to person, place, and time [Impaired Insight] : insight and judgment were intact [No Anxiety] : not feeling anxious [Well Developed] : well developed [Well Nourished] : well nourished [No Acute Distress] : no acute distress [Normal Conjunctiva] : normal conjunctiva [Normal Venous Pressure] : normal venous pressure [No Carotid Bruit] : no carotid bruit [Normal S1, S2] : normal S1, S2 [No Rub] : no rub [No Gallop] : no gallop [5th Left ICS - MCL] : palpated at the 5th LICS in the midclavicular line [Normal] : normal [No Precordial Heave] : no precordial heave was noted [I] : a grade 1 [2+] : left 2+ [Clear Lung Fields] : clear lung fields [Good Air Entry] : good air entry [No Respiratory Distress] : no respiratory distress  [Soft] : abdomen soft [Non Tender] : non-tender [No Masses/organomegaly] : no masses/organomegaly [Normal Bowel Sounds] : normal bowel sounds [Normal Gait] : normal gait [No Edema] : no edema [No Cyanosis] : no cyanosis [No Clubbing] : no clubbing [No Varicosities] : no varicosities [No Rash] : no rash [No Skin Lesions] : no skin lesions [Moves all extremities] : moves all extremities [No Focal Deficits] : no focal deficits [Normal Speech] : normal speech [Alert and Oriented] : alert and oriented [Normal memory] : normal memory

## 2022-01-20 ENCOUNTER — APPOINTMENT (OUTPATIENT)
Dept: PULMONOLOGY | Facility: CLINIC | Age: 27
End: 2022-01-20
Payer: COMMERCIAL

## 2022-01-20 PROCEDURE — 99212 OFFICE O/P EST SF 10 MIN: CPT | Mod: 95

## 2022-01-20 NOTE — HISTORY OF PRESENT ILLNESS
[Never] : never [Home] : at home, [unfilled] , at the time of the visit. [Medical Office: ([facility])___] : at the medical office located in  [Verbal consent obtained from patient] : the patient, [unfilled] [TextBox_4] : Patient is a 26 year old female Hx asthma, thyroid cancer,  s/p thyroidectomy residual vocal cord dysfunction, recent treatment iodine, presents for follow up.  Patient is routinely tested for COVID 19 virus infection at her job site.  She is fully vaccinated and boosted.  She was told today her swab cam back positive for chlamydia.  She has been experiencing chest tightness and congestion on and off over the last three months.  She has been treated with antibiotics and Prednisone during this time however symptoms recur.  She is using Dulera 100 regularly as prescribed.  She is here for advice on plan of care.

## 2022-01-20 NOTE — ASSESSMENT
[FreeTextEntry1] : 26 year old female Hx thyroid cancer s/p thyroidectomy, iodine treatment, vocal cord dysfunction presents for follow up newly diagnosed chlamydia infection\par \par Start Clarithromycin 500 mg twice daily x 7 days\par Continue Dulera 100  twice daily as directed\par Obtain CT chest\par Consider bronchoscopy\par \par Follow up pending CT chest \par

## 2022-01-20 NOTE — REASON FOR VISIT
[Acute] : an acute visit [Pneumonia] : pneumonia [TextBox_44] : cough, wheeze, chest tightness/heaviness

## 2022-01-20 NOTE — PHYSICAL EXAM
[No Acute Distress] : no acute distress [No Clubbing] : no clubbing [No Cyanosis] : no cyanosis [No Edema] : no edema [FROM] : FROM [No Focal Deficits] : no focal deficits [Oriented x3] : oriented x3 [Normal Affect] : normal affect [TextBox_44] : Right anterior neck scar

## 2022-01-26 ENCOUNTER — OUTPATIENT (OUTPATIENT)
Dept: OUTPATIENT SERVICES | Facility: HOSPITAL | Age: 27
LOS: 1 days | End: 2022-01-26
Payer: COMMERCIAL

## 2022-01-26 ENCOUNTER — APPOINTMENT (OUTPATIENT)
Dept: CT IMAGING | Facility: IMAGING CENTER | Age: 27
End: 2022-01-26
Payer: COMMERCIAL

## 2022-01-26 DIAGNOSIS — R93.89 ABNORMAL FINDINGS ON DIAGNOSTIC IMAGING OF OTHER SPECIFIED BODY STRUCTURES: ICD-10-CM

## 2022-01-26 PROCEDURE — 71250 CT THORAX DX C-: CPT

## 2022-01-26 PROCEDURE — 71250 CT THORAX DX C-: CPT | Mod: 26

## 2022-01-27 ENCOUNTER — TRANSCRIPTION ENCOUNTER (OUTPATIENT)
Age: 27
End: 2022-01-27

## 2022-01-28 ENCOUNTER — APPOINTMENT (OUTPATIENT)
Dept: CARDIOLOGY | Facility: CLINIC | Age: 27
End: 2022-01-28

## 2022-02-14 ENCOUNTER — APPOINTMENT (OUTPATIENT)
Dept: CARDIOLOGY | Facility: CLINIC | Age: 27
End: 2022-02-14

## 2022-02-18 ENCOUNTER — APPOINTMENT (OUTPATIENT)
Dept: PULMONOLOGY | Facility: CLINIC | Age: 27
End: 2022-02-18
Payer: COMMERCIAL

## 2022-02-18 VITALS
BODY MASS INDEX: 26.45 KG/M2 | WEIGHT: 126 LBS | RESPIRATION RATE: 16 BRPM | SYSTOLIC BLOOD PRESSURE: 122 MMHG | DIASTOLIC BLOOD PRESSURE: 84 MMHG | HEART RATE: 90 BPM | HEIGHT: 58 IN | TEMPERATURE: 97.8 F | OXYGEN SATURATION: 99 %

## 2022-02-18 PROCEDURE — 99214 OFFICE O/P EST MOD 30 MIN: CPT

## 2022-02-19 NOTE — ASSESSMENT
[FreeTextEntry1] : 26 year old female Hx mild persistent asthma s/p thyroidectomy for malignant neoplasm thyroid, residual  vocal cord dysfunction, radioactive iodine treatment, presents for follow up persistent symptoms SOB chest tightness in setting of minimal obstruction on PFT, no acute findings on CT 1/26/22, recent treatment thyroid cancer, anxiety, symptoms multifactorial including vocal cord dysfunction, \par \par Continue Dulera 200-5 2 puffs twice daily\par Continue Albuterol/Ipratropium via nebulizer every 4-6 hours\par PFT next visit 5/2022\par \par Patient offered second opinion for ongoing symptoms.  Given fever per patient report will refer to ID for FUO.  Patient given referral for second opinion however she does not feel she needs second opinion.\par \par Reassurance provided.\par \par Referral to ID\par \par Follow up pending evaluation.\par \par

## 2022-02-19 NOTE — HISTORY OF PRESENT ILLNESS
[Never] : never [TextBox_4] : Patient is a 26 year old female Hx asthma, thyroid cancer,  s/p thyroidectomy residual vocal cord dysfunction, recent treatment iodine, presents for follow up. She is vaccinated.  She reports she is frequently ill with low grade fever, fatigue and inability to take a deep breath.  She feels there is a "band" around her chest.  She describes non specific chest  and rib discomfort.  PFT testing, CT scan  is unrevealing.  She is using Dulera regularly.  She is here for follow up.

## 2022-02-19 NOTE — PROCEDURE
[FreeTextEntry1] : PFT 6/5/2019 personally reviewed minimal obstructive ventilatory defect without gas exchange abnormality and insignificant bronchodilator response.\par \par CXR 1/9/20 personally reviewed clear lungs \par \par CT chest [facility] 3/22/21 personally reviewed see HPI\par \par PFT 5/10/21 personally reviewed mild obstructive ventilatory defect without gas exchange abnormality\par \par CT chest 1/26/22 personally reviewed two pulmonary micro nodules unchanged from previous CT

## 2022-02-21 ENCOUNTER — APPOINTMENT (OUTPATIENT)
Dept: CARDIOLOGY | Facility: CLINIC | Age: 27
End: 2022-02-21

## 2022-02-24 ENCOUNTER — APPOINTMENT (OUTPATIENT)
Dept: FAMILY MEDICINE | Facility: CLINIC | Age: 27
End: 2022-02-24
Payer: COMMERCIAL

## 2022-02-24 VITALS
RESPIRATION RATE: 16 BRPM | TEMPERATURE: 98 F | DIASTOLIC BLOOD PRESSURE: 80 MMHG | OXYGEN SATURATION: 98 % | HEART RATE: 75 BPM | SYSTOLIC BLOOD PRESSURE: 132 MMHG

## 2022-02-24 PROCEDURE — 99214 OFFICE O/P EST MOD 30 MIN: CPT

## 2022-02-27 NOTE — PHYSICAL EXAM
[Normal] : soft, non-tender, non-distended, no masses palpated, no HSM and normal bowel sounds [de-identified] : no rebound or guarding, neg toth's sign, neg mcburney's point

## 2022-02-27 NOTE — REVIEW OF SYSTEMS
[Nausea] : nausea [Vomiting] : vomiting [Negative] : Gastrointestinal [Abdominal Pain] : no abdominal pain

## 2022-02-27 NOTE — ASSESSMENT
[FreeTextEntry1] : VSS, exam normal\par symptoms may be due to food borne vs gerd vs acute GE\par advised BRAT diet and to advance as tolerated\par medication as prescribed, medication education done \par advised to increase fluid intake, rest, and acetaminophen/ibuprofen prn pain/fever\par advised if sx worsens or persists- including but not limited to vomitng reoccurance, fever or abdominal pain- to go to ED \par scheduled to see GI next week\par follow up in office if sx persists or worsens\par patient verbalizes understanding and is stable upon d/c\par

## 2022-02-27 NOTE — HISTORY OF PRESENT ILLNESS
[FreeTextEntry8] : c/o nausea \par vomiting since sunday \par vomited at night on sunday- 3 x- food and then bile\par monday- vomiting stopped and started to take pepto \par eating soup and crackers since \par denies any fever or chills\par denies any diarrhea or constipation\par denies any abdominal pain\par still having- night sweats, has been ongoing since ORTIZ treatment \par seeing pulm- recommending ID evaluation \par seeing GI - for endo and colonoscopy on 3/1\par mood is better, anxiety improved \par sunday abdominal pain above belly \par denies any other complaints or concerns at this time \par

## 2022-02-28 ENCOUNTER — APPOINTMENT (OUTPATIENT)
Dept: CARDIOLOGY | Facility: CLINIC | Age: 27
End: 2022-02-28
Payer: COMMERCIAL

## 2022-02-28 ENCOUNTER — NON-APPOINTMENT (OUTPATIENT)
Age: 27
End: 2022-02-28

## 2022-02-28 VITALS
HEART RATE: 79 BPM | OXYGEN SATURATION: 99 % | SYSTOLIC BLOOD PRESSURE: 114 MMHG | TEMPERATURE: 98.4 F | DIASTOLIC BLOOD PRESSURE: 77 MMHG | BODY MASS INDEX: 25.4 KG/M2 | HEIGHT: 58 IN | WEIGHT: 121 LBS

## 2022-02-28 VITALS
TEMPERATURE: 98.4 F | HEART RATE: 79 BPM | RESPIRATION RATE: 16 BRPM | HEIGHT: 58 IN | WEIGHT: 121 LBS | BODY MASS INDEX: 25.4 KG/M2 | OXYGEN SATURATION: 99 %

## 2022-02-28 PROCEDURE — 93306 TTE W/DOPPLER COMPLETE: CPT

## 2022-02-28 PROCEDURE — 93000 ELECTROCARDIOGRAM COMPLETE: CPT

## 2022-02-28 PROCEDURE — 99214 OFFICE O/P EST MOD 30 MIN: CPT

## 2022-02-28 PROCEDURE — 93246 EXT ECG>7D<15D RECORDING: CPT

## 2022-02-28 NOTE — REASON FOR VISIT
[CV Risk Factors and Non-Cardiac Disease] : CV risk factors and non-cardiac disease [Other: ____] : [unfilled] [Consultation] : a consultation regarding [Palpitations] : palpitations [FreeTextEntry1] : murmur

## 2022-02-28 NOTE — PHYSICAL EXAM
[Well Developed] : well developed [Well Nourished] : well nourished [No Acute Distress] : no acute distress [Normal Venous Pressure] : normal venous pressure [No Carotid Bruit] : no carotid bruit [Normal S1, S2] : normal S1, S2 [No Rub] : no rub [Normal] : normal [Clear Lung Fields] : clear lung fields [Good Air Entry] : good air entry [No Respiratory Distress] : no respiratory distress  [Soft] : abdomen soft [Non Tender] : non-tender [No Masses/organomegaly] : no masses/organomegaly [Normal Bowel Sounds] : normal bowel sounds [Normal Gait] : normal gait [No Edema] : no edema [No Cyanosis] : no cyanosis [No Clubbing] : no clubbing [No Varicosities] : no varicosities [No Rash] : no rash [No Skin Lesions] : no skin lesions [Moves all extremities] : moves all extremities [No Focal Deficits] : no focal deficits [Normal Speech] : normal speech [Alert and Oriented] : alert and oriented [Normal memory] : normal memory [General Appearance - Well Developed] : well developed [General Appearance - Well Nourished] : well nourished [Normal Conjunctiva] : the conjunctiva exhibited no abnormalities [Normal Oral Mucosa] : normal oral mucosa [Normal Oropharynx] : normal oropharynx [Normal Jugular Venous V Waves Present] : normal jugular venous V waves present [5th Left ICS - MCL] : palpated at the 5th LICS in the midclavicular line [No Precordial Heave] : no precordial heave was noted [Normal Rate] : normal [Rhythm Regular] : regular [Normal S1] : normal S1 [Normal S2] : normal S2 [No Gallop] : no gallop heard [I] : a grade 1 [Click] : a ~M click was heard [2+] : left 2+ [No Pitting Edema] : no pitting edema present [Respiration, Rhythm And Depth] : normal respiratory rhythm and effort [Bowel Sounds] : normal bowel sounds [Abdomen Soft] : soft [Abnormal Walk] : normal gait [Nail Clubbing] : no clubbing of the fingernails [Cyanosis, Localized] : no localized cyanosis [Skin Color & Pigmentation] : normal skin color and pigmentation [Skin Turgor] : normal skin turgor [] : no rash [Oriented To Time, Place, And Person] : oriented to person, place, and time [Impaired Insight] : insight and judgment were intact [No Anxiety] : not feeling anxious

## 2022-02-28 NOTE — DISCUSSION/SUMMARY
[FreeTextEntry1] : This is a 26-year-old female with past medical history significant for asthma, ADHD, gluten intolerance, diagnosed with thyroid cancer in 2021, status post thyroidectomy in June 2021, who comes in for cardiac evaluation.  She does complain of palpitations usually in the evening best described as a rapid heartbeat unrelated to any particular activity.\par She denies shortness of breath, dizziness, or syncope. \par She has no history of rheumatic fever.  She has no known cardiac risk factors.\par Electrocardiogram done February 28, 2022 demonstrate normal sinus rhythm rate 78 bpm is otherwise unremarkable.\par \par Patient does consume 1 glass of wine on most nights.  I feel this is the most likely trigger for her palpitations.  She will have a 2-week event monitor placed today to evaluate her symptoms.  I have asked her to increase her fluid intake, and limit her alcohol intake.\par Further recommendation will made after results of the event monitor are available for my review.\par She reports that her thyroid functions have been within normal limits.\par Electrocardiogram done January 10, 2022 demonstrate normal sinus rhythm rate of 87 bpm is otherwise unremarkable.\par PMH:\par  Pt reports the palpitations started in September after she received her radioactive iodine treatment.  She states the palpitations are worse at night and feel like her heart is beating out of her chest. At the time of these palpitations she states she usually checks her pulse ox which states her HR is in the 100's-110's.\par Labs from Dec 8th, 2021: TSH 0.8, T4 1.6, calcium 9.3\par She is a .\par \par She will follow-up with me after above-noted diagnostic tests are completed.\par The patient understands that aerobic exercises must be increased to 40 minutes 4 times per week. A detailed discussion of lifestyle modification was done today. The patient has a good understanding of the diagnosis, and treatment plan. Lifestyle modification was also outlined.\par \par Thank you for allowing to participate in the care of your patient.  Please do not hesitate to call if you have any questions.

## 2022-03-01 RX ORDER — ONDANSETRON 4 MG/1
4 TABLET ORAL 4 TIMES DAILY
Qty: 20 | Refills: 0 | Status: COMPLETED | COMMUNITY
Start: 2022-02-24 | End: 2022-03-01

## 2022-03-01 RX ORDER — FLUCONAZOLE 150 MG/1
150 TABLET ORAL ONCE
Qty: 1 | Refills: 0 | Status: COMPLETED | COMMUNITY
Start: 2021-11-03 | End: 2022-03-01

## 2022-03-01 RX ORDER — CLARITHROMYCIN 500 MG/1
500 TABLET, FILM COATED, EXTENDED RELEASE ORAL DAILY
Qty: 14 | Refills: 0 | Status: COMPLETED | COMMUNITY
Start: 2022-01-20 | End: 2022-03-01

## 2022-03-16 ENCOUNTER — APPOINTMENT (OUTPATIENT)
Dept: FAMILY MEDICINE | Facility: CLINIC | Age: 27
End: 2022-03-16
Payer: COMMERCIAL

## 2022-03-16 VITALS
RESPIRATION RATE: 16 BRPM | BODY MASS INDEX: 25.92 KG/M2 | SYSTOLIC BLOOD PRESSURE: 102 MMHG | WEIGHT: 124 LBS | OXYGEN SATURATION: 98 % | DIASTOLIC BLOOD PRESSURE: 60 MMHG | TEMPERATURE: 98.3 F | HEART RATE: 84 BPM

## 2022-03-16 PROCEDURE — 36415 COLL VENOUS BLD VENIPUNCTURE: CPT

## 2022-03-16 PROCEDURE — 99214 OFFICE O/P EST MOD 30 MIN: CPT | Mod: 25

## 2022-03-16 NOTE — PRE PROCEDURE NOTE - PRE PROCEDURE EVALUATION
Attending Physician:  Dr Vogel                          Procedure: EGD/colon    Indication for Procedure: abd pain, bloating  ________________________________________________________  PAST MEDICAL & SURGICAL HISTORY:    ALLERGIES:  No Known Allergies    HOME MEDICATIONS:    AICD/PPM: [ ] yes   [x ] no    PERTINENT LAB DATA:                      PHYSICAL EXAMINATION:    T(C): --  HR: --  BP: --  RR: --  SpO2: --    Constitutional: NAD  HEENT: PERRLA, EOMI,    Neck:  No JVD  Respiratory: CTAB/L  Cardiovascular: S1 and S2  Gastrointestinal: BS+, soft, NT/ND  Extremities: No peripheral edema  Neurological: A/O x 3, no focal deficits  Psychiatric: Normal mood, normal affect  Skin: No rashes    ASA Class: I [ ]  II [x ]  III [ ]  IV [ ]    COMMENTS:    The patient is a suitable candidate for the planned procedure unless box checked [ ]  No, explain:

## 2022-03-17 ENCOUNTER — RESULT REVIEW (OUTPATIENT)
Age: 27
End: 2022-03-17

## 2022-03-17 ENCOUNTER — APPOINTMENT (OUTPATIENT)
Dept: GASTROENTEROLOGY | Facility: HOSPITAL | Age: 27
End: 2022-03-17

## 2022-03-17 ENCOUNTER — OUTPATIENT (OUTPATIENT)
Dept: OUTPATIENT SERVICES | Facility: HOSPITAL | Age: 27
LOS: 1 days | End: 2022-03-17
Payer: COMMERCIAL

## 2022-03-17 VITALS
DIASTOLIC BLOOD PRESSURE: 75 MMHG | HEART RATE: 71 BPM | RESPIRATION RATE: 20 BRPM | SYSTOLIC BLOOD PRESSURE: 113 MMHG | OXYGEN SATURATION: 100 %

## 2022-03-17 VITALS
OXYGEN SATURATION: 100 % | RESPIRATION RATE: 20 BRPM | HEART RATE: 83 BPM | SYSTOLIC BLOOD PRESSURE: 126 MMHG | HEIGHT: 58 IN | WEIGHT: 126.1 LBS | TEMPERATURE: 97 F | DIASTOLIC BLOOD PRESSURE: 90 MMHG

## 2022-03-17 DIAGNOSIS — R14.0 ABDOMINAL DISTENSION (GASEOUS): ICD-10-CM

## 2022-03-17 DIAGNOSIS — E89.0 POSTPROCEDURAL HYPOTHYROIDISM: Chronic | ICD-10-CM

## 2022-03-17 DIAGNOSIS — R10.9 UNSPECIFIED ABDOMINAL PAIN: ICD-10-CM

## 2022-03-17 PROCEDURE — 45378 DIAGNOSTIC COLONOSCOPY: CPT

## 2022-03-17 PROCEDURE — 43239 EGD BIOPSY SINGLE/MULTIPLE: CPT | Mod: GC,59

## 2022-03-17 PROCEDURE — 88305 TISSUE EXAM BY PATHOLOGIST: CPT | Mod: 26

## 2022-03-17 PROCEDURE — 43239 EGD BIOPSY SINGLE/MULTIPLE: CPT

## 2022-03-17 PROCEDURE — 88305 TISSUE EXAM BY PATHOLOGIST: CPT

## 2022-03-17 PROCEDURE — 45378 DIAGNOSTIC COLONOSCOPY: CPT | Mod: GC

## 2022-03-17 DEVICE — NET RETRV ROT ROTH 2.5MMX230CM: Type: IMPLANTABLE DEVICE | Status: FUNCTIONAL

## 2022-03-17 RX ORDER — ALBUTEROL 90 UG/1
3 AEROSOL, METERED ORAL
Qty: 0 | Refills: 0 | DISCHARGE

## 2022-03-17 RX ORDER — LIDOCAINE HCL 20 MG/ML
4 VIAL (ML) INJECTION ONCE
Refills: 0 | Status: DISCONTINUED | OUTPATIENT
Start: 2022-03-17 | End: 2022-03-31

## 2022-03-17 RX ORDER — SODIUM CHLORIDE 9 MG/ML
500 INJECTION, SOLUTION INTRAVENOUS
Refills: 0 | Status: COMPLETED | OUTPATIENT
Start: 2022-03-17 | End: 2022-03-17

## 2022-03-17 RX ORDER — LEVOTHYROXINE SODIUM 125 MCG
1 TABLET ORAL
Qty: 0 | Refills: 0 | DISCHARGE

## 2022-03-17 RX ORDER — DEXTROAMPHETAMINE SACCHARATE, AMPHETAMINE ASPARTATE, DEXTROAMPHETAMINE SULFATE AND AMPHETAMINE SULFATE 1.875; 1.875; 1.875; 1.875 MG/1; MG/1; MG/1; MG/1
1 TABLET ORAL
Qty: 0 | Refills: 0 | DISCHARGE

## 2022-03-17 RX ORDER — MOMETASONE FUROATE AND FORMOTEROL FUMARATE DIHYDRATE 200; 5 UG/1; UG/1
2 AEROSOL RESPIRATORY (INHALATION)
Qty: 0 | Refills: 0 | DISCHARGE

## 2022-03-17 RX ADMIN — SODIUM CHLORIDE 30 MILLILITER(S): 9 INJECTION, SOLUTION INTRAVENOUS at 08:34

## 2022-03-17 NOTE — HISTORY OF PRESENT ILLNESS
[FreeTextEntry1] : 25 yo female with PMHx thyroid ca with resection (tirosint), asthma (dulera, albuterol) presents to the office for a f/u after her recent surgery. [de-identified] : the patient reports has pending appointments with hematology, endocrinology in april. has an appointment for a colonoscopy/endoscopy tomorrow for persistent nausea/vomiting. she states that a dew days a month, she vomits for an entire day, and is unable to tolerate any PO intake. she also recently saw a cardiologist, completed a halter monitor evaluation, and is waiting for the results. she also reports increased fatigue for the past month, which she has discussed with her cardiologist, and has an upcoming appointment with her established endocrinologist, the plan is to increase the current dose tirosint 112mcg to 125mcg QD.

## 2022-03-17 NOTE — ASU DISCHARGE PLAN (ADULT/PEDIATRIC) - NS MD DC FALL RISK RISK
For information on Fall & Injury Prevention, visit: [email or web address] OR  [email or web address] OR  https://www.cdc.gov/steadi/patient.html

## 2022-03-17 NOTE — ASU PATIENT PROFILE, ADULT - FALL HARM RISK - UNIVERSAL INTERVENTIONS
Bed in lowest position, wheels locked, appropriate side rails in place/Call bell, personal items and telephone in reach/Instruct patient to call for assistance before getting out of bed or chair/Non-slip footwear when patient is out of bed/[place] to call system/Physically safe environment - no spills, clutter or unnecessary equipment/Purposeful Proactive Rounding/Room/bathroom lighting operational, light cord in reach

## 2022-03-17 NOTE — PHYSICAL EXAM
[Normal Rate] : normal rate  [Regular Rhythm] : with a regular rhythm [Normal S1, S2] : normal S1 and S2 [Coordination Grossly Intact] : coordination grossly intact [No Focal Deficits] : no focal deficits [Normal Gait] : normal gait [Speech Grossly Normal] : speech grossly normal [Alert and Oriented x3] : oriented to person, place, and time [Normal Mood] : the mood was normal [Normal] : affect was normal and insight and judgment were intact

## 2022-03-17 NOTE — PRE-ANESTHESIA EVALUATION ADULT - NSANTHOSAYNRD_GEN_A_CORE
No. OSMANY screening performed.  STOP BANG Legend: 0-2 = LOW Risk; 3-4 = INTERMEDIATE Risk; 5-8 = HIGH Risk

## 2022-03-17 NOTE — REVIEW OF SYSTEMS
[Fatigue] : fatigue [Palpitations] : palpitations [Nausea] : nausea [Anxiety] : anxiety [Negative] : Respiratory [Fever] : no fever [Chills] : no chills [Hot Flashes] : no hot flashes [Night Sweats] : no night sweats [Chest Pain] : no chest pain [Dysuria] : no dysuria [Headache] : no headache [Dizziness] : no dizziness [Suicidal] : not suicidal [Insomnia] : no insomnia [Depression] : no depression [FreeTextEntry5] : at times

## 2022-03-17 NOTE — ASU PREOP CHECKLIST - BP NONINVASIVE SYSTOLIC (MM HG)
[facility] dept does not obtain prior authorization for medication. Thank you, [employer]. [phone]

## 2022-03-18 LAB
ALBUMIN SERPL ELPH-MCNC: 5.1 G/DL
ALP BLD-CCNC: 50 U/L
ALT SERPL-CCNC: 13 U/L
ANION GAP SERPL CALC-SCNC: 14 MMOL/L
AST SERPL-CCNC: 15 U/L
BASOPHILS # BLD AUTO: 0.12 K/UL
BASOPHILS NFR BLD AUTO: 1.8 %
BILIRUB SERPL-MCNC: 0.3 MG/DL
BUN SERPL-MCNC: 15 MG/DL
CALCIUM SERPL-MCNC: 9.3 MG/DL
CHLORIDE SERPL-SCNC: 102 MMOL/L
CO2 SERPL-SCNC: 23 MMOL/L
CREAT SERPL-MCNC: 0.77 MG/DL
EGFR: 108 ML/MIN/1.73M2
EOSINOPHIL # BLD AUTO: 0.17 K/UL
EOSINOPHIL NFR BLD AUTO: 2.6 %
FERRITIN SERPL-MCNC: 8 NG/ML
FOLATE SERPL-MCNC: 11.9 NG/ML
GLUCOSE SERPL-MCNC: 99 MG/DL
HCT VFR BLD CALC: 43.6 %
HGB BLD-MCNC: 13.1 G/DL
IMM GRANULOCYTES NFR BLD AUTO: 0.5 %
IRON SATN MFR SERPL: 10 %
IRON SERPL-MCNC: 44 UG/DL
LYMPHOCYTES # BLD AUTO: 1.73 K/UL
LYMPHOCYTES NFR BLD AUTO: 26.1 %
MAN DIFF?: NORMAL
MCHC RBC-ENTMCNC: 24.9 PG
MCHC RBC-ENTMCNC: 30 GM/DL
MCV RBC AUTO: 82.9 FL
MONOCYTES # BLD AUTO: 0.44 K/UL
MONOCYTES NFR BLD AUTO: 6.6 %
NEUTROPHILS # BLD AUTO: 4.13 K/UL
NEUTROPHILS NFR BLD AUTO: 62.4 %
PLATELET # BLD AUTO: 426 K/UL
POTASSIUM SERPL-SCNC: 4.5 MMOL/L
PROT SERPL-MCNC: 7.2 G/DL
RBC # BLD: 5.26 M/UL
RBC # FLD: 17.6 %
SODIUM SERPL-SCNC: 140 MMOL/L
T4 FREE SERPL-MCNC: 1.9 NG/DL
TIBC SERPL-MCNC: 448 UG/DL
TSH SERPL-ACNC: 0.9 UIU/ML
UIBC SERPL-MCNC: 405 UG/DL
VIT B12 SERPL-MCNC: 476 PG/ML
WBC # FLD AUTO: 6.62 K/UL

## 2022-03-21 DIAGNOSIS — Z11.1 ENCOUNTER FOR SCREENING FOR RESPIRATORY TUBERCULOSIS: ICD-10-CM

## 2022-03-21 DIAGNOSIS — Z11.3 ENCOUNTER FOR SCREENING FOR INFECTIONS WITH A PREDOMINANTLY SEXUAL MODE OF TRANSMISSION: ICD-10-CM

## 2022-03-21 DIAGNOSIS — R49.9 UNSPECIFIED VOICE AND RESONANCE DISORDER: ICD-10-CM

## 2022-03-21 DIAGNOSIS — H53.2 DIPLOPIA: ICD-10-CM

## 2022-03-21 DIAGNOSIS — Z87.42 PERSONAL HISTORY OF OTHER DISEASES OF THE FEMALE GENITAL TRACT: ICD-10-CM

## 2022-03-21 DIAGNOSIS — M79.669 PAIN IN UNSPECIFIED LOWER LEG: ICD-10-CM

## 2022-03-21 DIAGNOSIS — H53.30 UNSPECIFIED DISORDER OF BINOCULAR VISION: ICD-10-CM

## 2022-03-21 DIAGNOSIS — U07.1 COVID-19: ICD-10-CM

## 2022-03-21 DIAGNOSIS — J06.9 ACUTE UPPER RESPIRATORY INFECTION, UNSPECIFIED: ICD-10-CM

## 2022-03-21 DIAGNOSIS — N76.0 ACUTE VAGINITIS: ICD-10-CM

## 2022-03-21 DIAGNOSIS — J45.998 OTHER ASTHMA: ICD-10-CM

## 2022-03-21 DIAGNOSIS — R50.9 FEVER, UNSPECIFIED: ICD-10-CM

## 2022-03-21 DIAGNOSIS — H92.02 OTALGIA, LEFT EAR: ICD-10-CM

## 2022-03-21 DIAGNOSIS — Z87.898 PERSONAL HISTORY OF OTHER SPECIFIED CONDITIONS: ICD-10-CM

## 2022-03-21 DIAGNOSIS — Z01.811 ENCOUNTER FOR PREPROCEDURAL RESPIRATORY EXAMINATION: ICD-10-CM

## 2022-03-21 DIAGNOSIS — Z20.822 CONTACT WITH AND (SUSPECTED) EXPOSURE TO COVID-19: ICD-10-CM

## 2022-03-21 DIAGNOSIS — Z11.59 ENCOUNTER FOR SCREENING FOR OTHER VIRAL DISEASES: ICD-10-CM

## 2022-03-21 DIAGNOSIS — R09.89 OTHER SPECIFIED SYMPTOMS AND SIGNS INVOLVING THE CIRCULATORY AND RESPIRATORY SYSTEMS: ICD-10-CM

## 2022-03-21 DIAGNOSIS — Z00.00 ENCOUNTER FOR GENERAL ADULT MEDICAL EXAMINATION W/OUT ABNORMAL FINDINGS: ICD-10-CM

## 2022-03-21 DIAGNOSIS — B96.89 ACUTE VAGINITIS: ICD-10-CM

## 2022-03-21 DIAGNOSIS — R43.2 PARAGEUSIA: ICD-10-CM

## 2022-03-21 DIAGNOSIS — Z91.09 OTHER ALLERGY STATUS, OTHER THAN TO DRUGS AND BIOLOGICAL SUBSTANCES: ICD-10-CM

## 2022-03-21 DIAGNOSIS — Z01.818 ENCOUNTER FOR OTHER PREPROCEDURAL EXAMINATION: ICD-10-CM

## 2022-03-21 DIAGNOSIS — R53.83 OTHER MALAISE: ICD-10-CM

## 2022-03-21 DIAGNOSIS — R01.1 CARDIAC MURMUR, UNSPECIFIED: ICD-10-CM

## 2022-03-21 DIAGNOSIS — R53.81 OTHER MALAISE: ICD-10-CM

## 2022-03-21 DIAGNOSIS — L27.0 GENERALIZED SKIN ERUPTION DUE TO DRUGS AND MEDICAMENTS TAKEN INTERNALLY: ICD-10-CM

## 2022-03-21 DIAGNOSIS — J45.901 UNSPECIFIED ASTHMA WITH (ACUTE) EXACERBATION: ICD-10-CM

## 2022-03-21 DIAGNOSIS — R39.9 UNSPECIFIED SYMPTOMS AND SIGNS INVOLVING THE GENITOURINARY SYSTEM: ICD-10-CM

## 2022-03-21 DIAGNOSIS — J20.9 ACUTE BRONCHITIS, UNSPECIFIED: ICD-10-CM

## 2022-03-21 DIAGNOSIS — J45.901 ACUTE BRONCHITIS, UNSPECIFIED: ICD-10-CM

## 2022-03-21 DIAGNOSIS — Z87.09 PERSONAL HISTORY OF OTHER DISEASES OF THE RESPIRATORY SYSTEM: ICD-10-CM

## 2022-03-21 DIAGNOSIS — Z01.812 ENCOUNTER FOR PREPROCEDURAL LABORATORY EXAMINATION: ICD-10-CM

## 2022-03-21 DIAGNOSIS — N89.8 OTHER SPECIFIED NONINFLAMMATORY DISORDERS OF VAGINA: ICD-10-CM

## 2022-03-21 LAB — SURGICAL PATHOLOGY STUDY: SIGNIFICANT CHANGE UP

## 2022-03-22 ENCOUNTER — APPOINTMENT (OUTPATIENT)
Dept: CARDIOLOGY | Facility: CLINIC | Age: 27
End: 2022-03-22
Payer: COMMERCIAL

## 2022-03-22 PROBLEM — J45.909 UNSPECIFIED ASTHMA, UNCOMPLICATED: Chronic | Status: ACTIVE | Noted: 2022-03-17

## 2022-03-22 PROBLEM — C73 MALIGNANT NEOPLASM OF THYROID GLAND: Chronic | Status: ACTIVE | Noted: 2022-03-17

## 2022-03-22 PROCEDURE — 93248 EXT ECG>7D<15D REV&INTERPJ: CPT

## 2022-03-25 ENCOUNTER — TRANSCRIPTION ENCOUNTER (OUTPATIENT)
Age: 27
End: 2022-03-25

## 2022-04-11 ENCOUNTER — APPOINTMENT (OUTPATIENT)
Dept: CARDIOLOGY | Facility: CLINIC | Age: 27
End: 2022-04-11

## 2022-04-13 ENCOUNTER — APPOINTMENT (OUTPATIENT)
Dept: GASTROENTEROLOGY | Facility: CLINIC | Age: 27
End: 2022-04-13
Payer: COMMERCIAL

## 2022-04-13 ENCOUNTER — NON-APPOINTMENT (OUTPATIENT)
Age: 27
End: 2022-04-13

## 2022-04-13 VITALS
WEIGHT: 127 LBS | DIASTOLIC BLOOD PRESSURE: 85 MMHG | HEART RATE: 97 BPM | SYSTOLIC BLOOD PRESSURE: 125 MMHG | HEIGHT: 58 IN | OXYGEN SATURATION: 99 % | TEMPERATURE: 98 F | BODY MASS INDEX: 26.66 KG/M2 | RESPIRATION RATE: 97 BRPM

## 2022-04-13 PROCEDURE — 99214 OFFICE O/P EST MOD 30 MIN: CPT

## 2022-04-13 NOTE — ASSESSMENT
[FreeTextEntry1] :  EL AMBROCIO is a 26 year old female with a history of thyroid cancer status post radioactive iodine, chemo, resection, Covid infection, asthma on Dulera, chronic abdominal discomfort and bloating\par \par #Chronic abdominal discomfort and bloating - likely IBS\par #History of thyroid cancer status post radioactive iodine chemo and surgery\par \par Recs:\par - discussed bloating could be 2/2 SIBO - will order lactulose breath test for evaluation\par - if positive will treat with abx, if negative discussed starting a low dose TCA like nortriptyline for IBS management\par - will also refer to [facility] nutrition and therapy for additional assistance managing symptoms \par - continue low FODMAP diet, OTC supplements that she is currently using\par - RTO end of June with me \par

## 2022-04-13 NOTE — HISTORY OF PRESENT ILLNESS
[de-identified] :  EL AMBROCIO is a 26 year old female with a history of thyroid cancer status post radioactive iodine, chemo, resection, Covid infection, asthma on Dulera, chronic GI issues\par \par She is here for followup of abdominal discomfort and bloating\par Since her last visit she underwent EGD and  colonoscopy which were normal\par She reports a slight improvement in her ability to eat more food but reports no interest in food and continued abdominal bloating and discomfort\par She is continuing with low FODMAP diet, FDgard, peppermint oil, teas without significant improvement in symptoms\par \par No red flag symptoms or weight changes

## 2022-04-13 NOTE — PHYSICAL EXAM
[General Appearance - Alert] : alert [General Appearance - In No Acute Distress] : in no acute distress [Sclera] : the sclera and conjunctiva were normal [Outer Ear] : the ears and nose were normal in appearance [Neck Appearance] : the appearance of the neck was normal [Auscultation Breath Sounds / Voice Sounds] : lungs were clear to auscultation bilaterally [Heart Rate And Rhythm] : heart rate was normal and rhythm regular [Heart Sounds] : normal S1 and S2 [Heart Sounds Gallop] : no gallops [Murmurs] : no murmurs [Heart Sounds Pericardial Friction Rub] : no pericardial rub [Bowel Sounds] : normal bowel sounds [Abdomen Soft] : soft [Abdomen Tenderness] : non-tender [Abdomen Mass (___ Cm)] : no abdominal mass palpated [Patient Refused] : rectal exam was refused by the patient [Involuntary Movements] : no involuntary movements were seen [Skin Color & Pigmentation] : normal skin color and pigmentation [Skin Turgor] : normal skin turgor [] : no rash [No Focal Deficits] : no focal deficits [Oriented To Time, Place, And Person] : oriented to person, place, and time [Impaired Insight] : insight and judgment were intact [Affect] : the affect was normal [FreeTextEntry1] : transverse thyroidectomy scar

## 2022-04-18 ENCOUNTER — OUTPATIENT (OUTPATIENT)
Dept: OUTPATIENT SERVICES | Facility: HOSPITAL | Age: 27
LOS: 1 days | Discharge: HOME | End: 2022-04-18

## 2022-04-18 DIAGNOSIS — E89.0 POSTPROCEDURAL HYPOTHYROIDISM: Chronic | ICD-10-CM

## 2022-04-18 DIAGNOSIS — D50.9 IRON DEFICIENCY ANEMIA, UNSPECIFIED: ICD-10-CM

## 2022-04-22 ENCOUNTER — APPOINTMENT (OUTPATIENT)
Dept: HEMATOLOGY ONCOLOGY | Facility: CLINIC | Age: 27
End: 2022-04-22

## 2022-05-10 ENCOUNTER — APPOINTMENT (OUTPATIENT)
Dept: CARDIOLOGY | Facility: CLINIC | Age: 27
End: 2022-05-10

## 2022-05-13 ENCOUNTER — NON-APPOINTMENT (OUTPATIENT)
Age: 27
End: 2022-05-13

## 2022-05-14 ENCOUNTER — TRANSCRIPTION ENCOUNTER (OUTPATIENT)
Age: 27
End: 2022-05-14

## 2022-05-31 ENCOUNTER — RESULT REVIEW (OUTPATIENT)
Age: 27
End: 2022-05-31

## 2022-05-31 ENCOUNTER — APPOINTMENT (OUTPATIENT)
Dept: ULTRASOUND IMAGING | Facility: CLINIC | Age: 27
End: 2022-05-31
Payer: COMMERCIAL

## 2022-05-31 ENCOUNTER — OUTPATIENT (OUTPATIENT)
Dept: OUTPATIENT SERVICES | Facility: HOSPITAL | Age: 27
LOS: 1 days | End: 2022-05-31
Payer: COMMERCIAL

## 2022-05-31 DIAGNOSIS — E89.0 POSTPROCEDURAL HYPOTHYROIDISM: Chronic | ICD-10-CM

## 2022-05-31 DIAGNOSIS — R92.8 OTHER ABNORMAL AND INCONCLUSIVE FINDINGS ON DIAGNOSTIC IMAGING OF BREAST: ICD-10-CM

## 2022-05-31 DIAGNOSIS — Z00.8 ENCOUNTER FOR OTHER GENERAL EXAMINATION: ICD-10-CM

## 2022-05-31 PROCEDURE — 19083 BX BREAST 1ST LESION US IMAG: CPT | Mod: LT

## 2022-05-31 PROCEDURE — 19083 BX BREAST 1ST LESION US IMAG: CPT

## 2022-05-31 PROCEDURE — 88305 TISSUE EXAM BY PATHOLOGIST: CPT

## 2022-05-31 PROCEDURE — A4648: CPT

## 2022-05-31 PROCEDURE — 88305 TISSUE EXAM BY PATHOLOGIST: CPT | Mod: 26

## 2022-06-08 ENCOUNTER — APPOINTMENT (OUTPATIENT)
Dept: PULMONOLOGY | Facility: CLINIC | Age: 27
End: 2022-06-08
Payer: COMMERCIAL

## 2022-06-08 VITALS
TEMPERATURE: 97.9 F | OXYGEN SATURATION: 97 % | HEIGHT: 58 IN | HEART RATE: 87 BPM | SYSTOLIC BLOOD PRESSURE: 122 MMHG | RESPIRATION RATE: 18 BRPM | BODY MASS INDEX: 26.66 KG/M2 | WEIGHT: 127 LBS | DIASTOLIC BLOOD PRESSURE: 80 MMHG

## 2022-06-08 PROCEDURE — 99214 OFFICE O/P EST MOD 30 MIN: CPT

## 2022-06-08 NOTE — HISTORY OF PRESENT ILLNESS
[Never] : never [TextBox_4] : Patient is a 27 year old female Hx asthma, thyroid cancer,  s/p thyroidectomy residual vocal cord dysfunction, recent treatment iodine, presents for follow up.

## 2022-06-08 NOTE — ASSESSMENT
[FreeTextEntry1] : 27 year old female Hx mild persistent asthma s/p thyroidectomy for malignant neoplasm thyroid, residual  vocal cord dysfunction, radioactive iodine treatment, presents for follow up \par \par Continue Dulera 200-5 2 puffs twice daily/consider Trelegy if symptoms persist\par Continue Albuterol/Ipratropium via nebulizer every 4-6 hours\par PFT next visit\par Repeat CT chest f/u pulmonary nodules  \par \par Reassurance provided.\par \par \par

## 2022-06-15 ENCOUNTER — APPOINTMENT (OUTPATIENT)
Dept: CARDIOLOGY | Facility: CLINIC | Age: 27
End: 2022-06-15

## 2022-06-20 ENCOUNTER — OUTPATIENT (OUTPATIENT)
Dept: OUTPATIENT SERVICES | Facility: HOSPITAL | Age: 27
LOS: 1 days | End: 2022-06-20
Payer: COMMERCIAL

## 2022-06-20 ENCOUNTER — APPOINTMENT (OUTPATIENT)
Dept: CT IMAGING | Facility: IMAGING CENTER | Age: 27
End: 2022-06-20
Payer: COMMERCIAL

## 2022-06-20 DIAGNOSIS — J38.3 OTHER DISEASES OF VOCAL CORDS: ICD-10-CM

## 2022-06-20 DIAGNOSIS — R93.89 ABNORMAL FINDINGS ON DIAGNOSTIC IMAGING OF OTHER SPECIFIED BODY STRUCTURES: ICD-10-CM

## 2022-06-20 DIAGNOSIS — C73 MALIGNANT NEOPLASM OF THYROID GLAND: ICD-10-CM

## 2022-06-20 DIAGNOSIS — J45.31 MILD PERSISTENT ASTHMA WITH (ACUTE) EXACERBATION: ICD-10-CM

## 2022-06-20 DIAGNOSIS — R91.8 OTHER NONSPECIFIC ABNORMAL FINDING OF LUNG FIELD: ICD-10-CM

## 2022-06-20 DIAGNOSIS — E89.0 POSTPROCEDURAL HYPOTHYROIDISM: Chronic | ICD-10-CM

## 2022-06-20 DIAGNOSIS — Z00.8 ENCOUNTER FOR OTHER GENERAL EXAMINATION: ICD-10-CM

## 2022-06-20 PROCEDURE — 71250 CT THORAX DX C-: CPT

## 2022-06-20 PROCEDURE — 71250 CT THORAX DX C-: CPT | Mod: 26

## 2022-06-29 ENCOUNTER — APPOINTMENT (OUTPATIENT)
Dept: CARDIOLOGY | Facility: CLINIC | Age: 27
End: 2022-06-29

## 2022-06-29 ENCOUNTER — APPOINTMENT (OUTPATIENT)
Dept: GASTROENTEROLOGY | Facility: CLINIC | Age: 27
End: 2022-06-29

## 2022-07-05 ENCOUNTER — APPOINTMENT (OUTPATIENT)
Dept: GASTROENTEROLOGY | Facility: HOSPITAL | Age: 27
End: 2022-07-05

## 2022-07-20 ENCOUNTER — LABORATORY RESULT (OUTPATIENT)
Age: 27
End: 2022-07-20

## 2022-07-20 ENCOUNTER — APPOINTMENT (OUTPATIENT)
Dept: CARDIOLOGY | Facility: CLINIC | Age: 27
End: 2022-07-20

## 2022-07-20 ENCOUNTER — NON-APPOINTMENT (OUTPATIENT)
Age: 27
End: 2022-07-20

## 2022-07-20 VITALS
SYSTOLIC BLOOD PRESSURE: 115 MMHG | DIASTOLIC BLOOD PRESSURE: 70 MMHG | BODY MASS INDEX: 26.24 KG/M2 | RESPIRATION RATE: 16 BRPM | HEIGHT: 58 IN | HEART RATE: 73 BPM | WEIGHT: 125 LBS | OXYGEN SATURATION: 100 % | TEMPERATURE: 98.7 F

## 2022-07-20 DIAGNOSIS — Z87.898 PERSONAL HISTORY OF OTHER SPECIFIED CONDITIONS: ICD-10-CM

## 2022-07-20 PROCEDURE — 93000 ELECTROCARDIOGRAM COMPLETE: CPT

## 2022-07-20 PROCEDURE — 99214 OFFICE O/P EST MOD 30 MIN: CPT | Mod: 25

## 2022-07-20 RX ORDER — NITROFURANTOIN (MONOHYDRATE/MACROCRYSTALS) 25; 75 MG/1; MG/1
100 CAPSULE ORAL
Qty: 10 | Refills: 0 | Status: DISCONTINUED | COMMUNITY
Start: 2022-05-14

## 2022-07-20 RX ORDER — ONDANSETRON 4 MG/1
4 TABLET ORAL EVERY 8 HOURS
Qty: 28 | Refills: 1 | Status: DISCONTINUED | COMMUNITY
Start: 2022-03-15 | End: 2022-07-20

## 2022-07-20 RX ORDER — ALBUTEROL SULFATE 90 UG/1
108 (90 BASE) AEROSOL, METERED RESPIRATORY (INHALATION)
Qty: 1 | Refills: 2 | Status: DISCONTINUED | COMMUNITY
Start: 2019-06-26 | End: 2022-07-20

## 2022-07-20 RX ORDER — CHOLECALCIFEROL (VITAMIN D3) 1250 MCG
1.25 MG CAPSULE ORAL
Qty: 8 | Refills: 0 | Status: ACTIVE | COMMUNITY
Start: 2022-04-27

## 2022-07-20 RX ORDER — DEXTROAMPHETAMINE SACCHARATE, AMPHETAMINE ASPARTATE, DEXTROAMPHETAMINE SULFATE AND AMPHETAMINE SULFATE 2.5; 2.5; 2.5; 2.5 MG/1; MG/1; MG/1; MG/1
10 TABLET ORAL
Qty: 60 | Refills: 0 | Status: DISCONTINUED | COMMUNITY
Start: 2022-06-02

## 2022-07-20 RX ORDER — PROPRANOLOL HYDROCHLORIDE 10 MG/1
10 TABLET ORAL
Refills: 0 | Status: DISCONTINUED | COMMUNITY
Start: 2022-07-20 | End: 2022-07-20

## 2022-07-20 NOTE — DISCUSSION/SUMMARY
[FreeTextEntry1] : Dr. Cuellar-(PRIOR VISIT and PMH WITH Dr. Cuellar): \par This is a 27-year-old female with past medical history significant for asthma, ADHD, gluten intolerance, diagnosed with thyroid cancer in 2021, status post thyroidectomy in June 2021, who comes in for cardiac evaluation.  She does complain of palpitations usually in the evening best described as a rapid heartbeat unrelated to any particular activity.\par \par She denies shortness of breath, dizziness, or syncope. \par \par She has no history of rheumatic fever.  She has no known cardiac risk factors.\par \par Electrocardiogram done February 28, 2022 demonstrate normal sinus rhythm rate 78 bpm is otherwise unremarkable.\par \par Patient does consume 1 glass of wine on most nights.  I feel this is the most likely trigger for her palpitations.  She will have a 2-week event monitor placed today to evaluate her symptoms.  I have asked her to increase her fluid intake, and limit her alcohol intake.\par \par Further recommendation will made after results of the event monitor are available for my review.\par She reports that her thyroid functions have been within normal limits.\par \par Electrocardiogram done January 10, 2022 demonstrate normal sinus rhythm rate of 87 bpm is otherwise unremarkable.\par \par PMH:\par  Pt reports the palpitations started in September after she received her radioactive iodine treatment.  She states the palpitations are worse at night and feel like her heart is beating out of her chest. At the time of these palpitations she states she usually checks her pulse ox which states her HR is in the 100's-110's.\par Labs from Dec 8th, 2021: TSH 0.8, T4 1.6, calcium 9.3\par She is a .\par \par She will follow-up with me after above-noted diagnostic tests are completed.\par The patient understands that aerobic exercises must be increased to 40 minutes 4 times per week. A detailed discussion of lifestyle modification was done today. The patient has a good understanding of the diagnosis, and treatment plan. Lifestyle modification was also outlined.\par \par Thank you for allowing to participate in the care of your patient.  Please do not hesitate to call if you have any questions.

## 2022-07-20 NOTE — PHYSICAL EXAM
[Well Developed] : well developed [Well Nourished] : well nourished [No Acute Distress] : no acute distress [Normal Venous Pressure] : normal venous pressure [No Carotid Bruit] : no carotid bruit [Normal S1, S2] : normal S1, S2 [No Rub] : no rub [Normal] : normal [Clear Lung Fields] : clear lung fields [Good Air Entry] : good air entry [No Respiratory Distress] : no respiratory distress  [Soft] : abdomen soft [Non Tender] : non-tender [No Masses/organomegaly] : no masses/organomegaly [Normal Bowel Sounds] : normal bowel sounds [Normal Gait] : normal gait [No Edema] : no edema [No Cyanosis] : no cyanosis [No Clubbing] : no clubbing [No Varicosities] : no varicosities [No Rash] : no rash [No Skin Lesions] : no skin lesions [Moves all extremities] : moves all extremities [No Focal Deficits] : no focal deficits [Normal Speech] : normal speech [Alert and Oriented] : alert and oriented [Normal memory] : normal memory [General Appearance - Well Developed] : well developed [General Appearance - Well Nourished] : well nourished [Normal Conjunctiva] : the conjunctiva exhibited no abnormalities [Normal Oral Mucosa] : normal oral mucosa [Normal Jugular Venous V Waves Present] : normal jugular venous V waves present [Normal Oropharynx] : normal oropharynx [5th Left ICS - MCL] : palpated at the 5th LICS in the midclavicular line [No Precordial Heave] : no precordial heave was noted [Normal Rate] : normal [Rhythm Regular] : regular [Normal S1] : normal S1 [Normal S2] : normal S2 [No Gallop] : no gallop heard [Click] : a ~M click was heard [I] : a grade 1 [2+] : left 2+ [No Pitting Edema] : no pitting edema present [Respiration, Rhythm And Depth] : normal respiratory rhythm and effort [Bowel Sounds] : normal bowel sounds [Abdomen Soft] : soft [Abnormal Walk] : normal gait [Nail Clubbing] : no clubbing of the fingernails [Cyanosis, Localized] : no localized cyanosis [Skin Color & Pigmentation] : normal skin color and pigmentation [Skin Turgor] : normal skin turgor [] : no rash [Oriented To Time, Place, And Person] : oriented to person, place, and time [Impaired Insight] : insight and judgment were intact [No Anxiety] : not feeling anxious

## 2022-07-20 NOTE — ASSESSMENT
[FreeTextEntry1] : 27 year old female with past medical history of asthma, ADHD and thyroid cancer  and palpitations who presents here today for for cardiac follow-up evaluation. \par \par She is feeling generally well but would like to make a note that her endocrinologist is increasing her thyroid medication which may cause her to have increased palpitations which she has been previously experiencing.  She actually had an ER hospitalization and ended up in [place] for a day and a half but was not admitted for palpitations.  She was told that they did a full cardiac work-up and she was discharged on propanolol 10 mg.  There is no records of this ER visit on file that we can review.  She does not have any chest pain at this time and right now her palpitations are manageable but still present.  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 ().\par \par BLOOD PRESSURE:\par -BP is well controlled in today's visit.\par \par BLOOD WORK:\par -New blood work was done today, 07/20/2022 to evaluate lipid profile, CBC, BMP, hepatic function, A1C and TSH\par \par TESTING/REPORTS:\par -EKG done Jul 20, 2022 which demonstrated regular sinus rhythm with nonspecific ST-T wave changes, BPM of 73.\par \par -Echocardiogram done February 28, 2022 demonstrated physiologic mitral, tricuspid and pulmonic regurgitation with normal left ventricular systolic function with an EF of 66%.\par \par -ZIO monitor placed on May 28, 2022 demonstrated an average heart rate of 95 bpm with no significant arrhythmia or heart block noted.\par \par -Electrocardiogram done February 28, 2022 demonstrate normal sinus rhythm rate 78 bpm is otherwise unremarkable.\par \par -Electrocardiogram done January 10, 2022 demonstrate normal sinus rhythm rate of 87 bpm is otherwise unremarkable.\par \par PMH:\par Pt reports the palpitations started in September after she received her radioactive iodine treatment.\par \par PLAN:\par -The patient will stop propanolol and will start metoprolol ER 12.5 mg daily in the evening.\par -She will keep me posted on how she is feeling once her endocrinologist increases her medications since in the past when this was done her palpitations became worse.  If this occurs she may benefit from 25 mg of metoprolol instead of the 12.5mg dose.\par -Shiva follow-up in 3 months.\par \par I have discussed the plan of care with Ms. EL AMBROCIO. She is compliant with all of her medications.\par \par The patient understands that aerobic exercises must be increased to minutes 4 times/week and a detailed discussion of lifestyle modification was done today. \par The patient has a good understanding of the diagnosis, treatment plan and lifestyle modification. \par She will contact me at the office for any questions with their care or any changes in their health status.\par \par \par Lovely DAWKINS

## 2022-08-02 ENCOUNTER — APPOINTMENT (OUTPATIENT)
Dept: PULMONOLOGY | Facility: CLINIC | Age: 27
End: 2022-08-02

## 2022-08-02 ENCOUNTER — APPOINTMENT (OUTPATIENT)
Dept: INTERNAL MEDICINE | Facility: CLINIC | Age: 27
End: 2022-08-02

## 2022-08-02 VITALS
SYSTOLIC BLOOD PRESSURE: 112 MMHG | OXYGEN SATURATION: 99 % | HEIGHT: 58 IN | DIASTOLIC BLOOD PRESSURE: 68 MMHG | HEART RATE: 89 BPM | WEIGHT: 128 LBS | TEMPERATURE: 98.1 F | BODY MASS INDEX: 26.87 KG/M2

## 2022-08-02 DIAGNOSIS — J38.01 PARALYSIS OF VOCAL CORDS AND LARYNX, UNILATERAL: ICD-10-CM

## 2022-08-02 PROCEDURE — 94729 DIFFUSING CAPACITY: CPT

## 2022-08-02 PROCEDURE — 99214 OFFICE O/P EST MOD 30 MIN: CPT | Mod: 25

## 2022-08-02 PROCEDURE — 94726 PLETHYSMOGRAPHY LUNG VOLUMES: CPT

## 2022-08-02 PROCEDURE — 94010 BREATHING CAPACITY TEST: CPT

## 2022-08-02 NOTE — PROCEDURE
[FreeTextEntry1] : PFT 6/5/2019 personally reviewed minimal obstructive ventilatory defect without gas exchange abnormality and insignificant bronchodilator response.\par \par CXR 1/9/20 personally reviewed clear lungs \par \par CT chest [facility] 3/22/21 personally reviewed see HPI\par \par PFT 5/10/21 personally reviewed mild obstructive ventilatory defect without gas exchange abnormality\par \par CT chest 1/26/22 personally reviewed two pulmonary micro nodules unchanged from previous CT\par \par PFT 8/2/22 personally reviewed mild obstructive ventilatory defect with moderate gas exchange abnormality\par \par CT chest personally reviewed 6/20/22 stable sub 3 mm nodules since 3/22/21

## 2022-08-02 NOTE — ASSESSMENT
[FreeTextEntry1] : 27 year old female Hx mild persistent asthma s/p thyroidectomy for malignant neoplasm thyroid, residual  vocal cord dysfunction, radioactive iodine treatment, presents for follow up \par \par Continue Dulera 200-5 2 puffs twice daily/consider Trelegy if symptoms persist\par Continue Albuterol/Ipratropium via nebulizer every 4-6 hours\par Repeat CT chest 12 months June 2023\par  \par Follow up 4 months

## 2022-08-02 NOTE — REASON FOR VISIT
[Follow-Up] : a follow-up visit [Asthma] : asthma [Pneumonia] : pneumonia [Shortness of Breath] : shortness of breath [Pulmonary Nodules] : pulmonary nodules [TextBox_44] : Hx thyroid cancer

## 2022-08-02 NOTE — HISTORY OF PRESENT ILLNESS
[Never] : never [TextBox_4] : Patient is a 27 year old female Hx asthma, thyroid cancer,  s/p thyroidectomy residual vocal cord dysfunction, recent treatment iodine, presents for follow up. Patient reports she is feeling well.  Thyroid medications continue to be titrated.  She reports breathing has been much better.  She denies chest tightness or other respiratory symptoms.

## 2022-08-29 ENCOUNTER — APPOINTMENT (OUTPATIENT)
Dept: ORTHOPEDIC SURGERY | Facility: CLINIC | Age: 27
End: 2022-08-29

## 2022-09-07 ENCOUNTER — NON-APPOINTMENT (OUTPATIENT)
Age: 27
End: 2022-09-07

## 2022-09-22 ENCOUNTER — APPOINTMENT (OUTPATIENT)
Dept: FAMILY MEDICINE | Facility: CLINIC | Age: 27
End: 2022-09-22

## 2022-09-22 VITALS
HEART RATE: 91 BPM | SYSTOLIC BLOOD PRESSURE: 120 MMHG | OXYGEN SATURATION: 98 % | RESPIRATION RATE: 16 BRPM | DIASTOLIC BLOOD PRESSURE: 78 MMHG | TEMPERATURE: 98.4 F

## 2022-09-22 DIAGNOSIS — R53.83 OTHER FATIGUE: ICD-10-CM

## 2022-09-22 PROCEDURE — 99214 OFFICE O/P EST MOD 30 MIN: CPT

## 2022-09-23 ENCOUNTER — APPOINTMENT (OUTPATIENT)
Dept: PULMONOLOGY | Facility: CLINIC | Age: 27
End: 2022-09-23

## 2022-09-23 DIAGNOSIS — Z86.16 PERSONAL HISTORY OF COVID-19: ICD-10-CM

## 2022-09-23 DIAGNOSIS — R91.8 OTHER NONSPECIFIC ABNORMAL FINDING OF LUNG FIELD: ICD-10-CM

## 2022-09-23 DIAGNOSIS — J38.3 OTHER DISEASES OF VOCAL CORDS: ICD-10-CM

## 2022-09-23 PROCEDURE — 99212 OFFICE O/P EST SF 10 MIN: CPT | Mod: 95

## 2022-09-23 RX ORDER — ALBUTEROL SULFATE 0.63 MG/3ML
0.63 SOLUTION RESPIRATORY (INHALATION)
Qty: 3 | Refills: 1 | Status: ACTIVE | COMMUNITY
Start: 2020-03-18 | End: 1900-01-01

## 2022-09-23 NOTE — PHYSICAL EXAM
[No Acute Distress] : no acute distress [Oriented x3] : oriented x3 [TextBox_44] : Right anterior neck scar

## 2022-09-23 NOTE — REASON FOR VISIT
[Follow-Up] : a follow-up visit [Asthma] : asthma [Shortness of Breath] : shortness of breath [Pulmonary Nodules] : pulmonary nodules [TextBox_44] : Hx thyroid cancer

## 2022-09-23 NOTE — ASSESSMENT
[FreeTextEntry1] : 27 year old female Hx mild persistent asthma s/p thyroidectomy for malignant neoplasm thyroid, residual  vocal cord dysfunction, radioactive iodine treatment, presents for follow up now s/p COVID 19 virus infection\par \par Continue Dulera 200-5 2 puffs twice daily/consider Trelegy if symptoms persist\par Continue Albuterol/Ipratropium via nebulizer every 4-6 hours\par Repeat CT chest 12 months June 2023\par \par Follow up 2-4 weeks \par  \par

## 2022-09-23 NOTE — PROCEDURE
[FreeTextEntry1] : PFT 6/5/2019 personally reviewed minimal obstructive ventilatory defect without gas exchange abnormality and insignificant bronchodilator response.\par \par CXR 1/9/20 personally reviewed clear lungs \par \par CT chest [facility] 3/22/21 personally reviewed see HPI\par \par PFT 5/10/21 personally reviewed mild obstructive ventilatory defect without gas exchange abnormality\par \par CT chest 1/26/22 personally reviewed two pulmonary micro nodules unchanged from previous CT\par \par PFT 8/2/22 personally reviewed mild obstructive ventilatory defect with moderate gas exchange abnormality\par \par CT chest personally reviewed 6/20/22 stable sub 3 mm nodules since 3/22/21

## 2022-09-23 NOTE — HISTORY OF PRESENT ILLNESS
[Home] : at home, [unfilled] , at the time of the visit. [Medical Office: ([facility])___] : at the medical office located in  [Verbal consent obtained from patient] : the patient, [unfilled] [Never] : never [TextBox_4] : Patient is a 27 year old female Hx asthma, thyroid cancer,  s/p thyroidectomy residual vocal cord dysfunction, recent treatment iodine, presents for follow up. Patient returned from [facility] and contracted COVID.  She is expecting to begin radioactive iodine treatments on Monday .  She reports since having COVID she is experiencing cough.  She is using Dulera regularly.  She did not receive Paxlovid with COVID infection.  She is using nebulizer treatments.  She reports she is feeling better but not completely at baseline.  She is here for follow up

## 2022-09-30 PROBLEM — R53.83 FATIGUE: Status: ACTIVE | Noted: 2021-07-21

## 2022-09-30 NOTE — HISTORY OF PRESENT ILLNESS
[FreeTextEntry8] : for follow up today \par has been overall is feeling well \par thyroglobulin has remained elevated \par patient will be undergoing another ORTIZ scan and tx- at [facility] \par endo Dr. Solomon \par under care of ENT and head & neck \par physically has been feeling well \par needs disability forms filled\par notes will feel severely fatigued after ORTIZ treatment \par will be undergoing 2 sessions \par return to work on 10/17\par denies any other associated symptoms\par denies any other complaints or concerns at this time \par

## 2022-09-30 NOTE — ASSESSMENT
[FreeTextEntry1] : VSS, exam normal\par form filled for patient \par c/w current management as per endo \par follow up in office if sx persists or worsens\par patient verbalizes understanding and is stable upon d/c\par

## 2022-10-12 ENCOUNTER — APPOINTMENT (OUTPATIENT)
Dept: FAMILY MEDICINE | Facility: CLINIC | Age: 27
End: 2022-10-12

## 2022-10-12 VITALS
BODY MASS INDEX: 26.32 KG/M2 | SYSTOLIC BLOOD PRESSURE: 122 MMHG | DIASTOLIC BLOOD PRESSURE: 82 MMHG | TEMPERATURE: 98 F | HEART RATE: 88 BPM | HEIGHT: 58 IN | WEIGHT: 125.4 LBS | OXYGEN SATURATION: 97 % | RESPIRATION RATE: 16 BRPM

## 2022-10-12 PROCEDURE — 99214 OFFICE O/P EST MOD 30 MIN: CPT

## 2022-10-16 NOTE — HISTORY OF PRESENT ILLNESS
[FreeTextEntry8] : for follow up today \par has been overall is feeling well \par ORTIZ scan and treatment done at [facility] \par thyroglobulin has remained stable \par endo Dr. Solomon - will be seeing in December \par under care of ENT and head & neck \par physically has been feeling well \par improved fatigue\par feeling mild sore throat for the past 2 days \par denies any fever or chills\par denies any sick contacts\par covid tests have been negative\par return on work monday 10/17/2022 \par denies any other associated symptoms\par denies any other complaints or concerns at this time \par

## 2022-10-16 NOTE — ASSESSMENT
[FreeTextEntry1] : VSS, exam normal\par c/w current management as per endo and ent\par letter return to return to work\par follow up in office if sx persists or worsens\par patient verbalizes understanding and is stable upon d/c\par

## 2022-10-21 LAB — BACTERIA THROAT CULT: NORMAL

## 2022-10-26 ENCOUNTER — APPOINTMENT (OUTPATIENT)
Dept: CARDIOLOGY | Facility: CLINIC | Age: 27
End: 2022-10-26

## 2022-11-28 ENCOUNTER — APPOINTMENT (OUTPATIENT)
Dept: ORTHOPEDIC SURGERY | Facility: CLINIC | Age: 27
End: 2022-11-28

## 2022-11-28 VITALS
SYSTOLIC BLOOD PRESSURE: 136 MMHG | WEIGHT: 125 LBS | HEART RATE: 87 BPM | BODY MASS INDEX: 26.24 KG/M2 | DIASTOLIC BLOOD PRESSURE: 83 MMHG | HEIGHT: 58 IN

## 2022-11-28 PROCEDURE — 73521 X-RAY EXAM HIPS BI 2 VIEWS: CPT

## 2022-11-28 PROCEDURE — 99204 OFFICE O/P NEW MOD 45 MIN: CPT

## 2022-11-28 PROCEDURE — 73630 X-RAY EXAM OF FOOT: CPT | Mod: LT

## 2022-11-28 NOTE — PHYSICAL EXAM
[LE] : Sensory: Intact in bilateral lower extremities [DP] : dorsalis pedis 2+ and symmetric bilaterally [PT] : posterior tibial 2+ and symmetric bilaterally [Normal] : Alert and in no acute distress [Poor Appearance] : well-appearing [Acute Distress] : not in acute distress [Obese] : not obese [de-identified] : The patient has no respiratory distress. Mood and affect are normal. The patient is alert and oriented to person, place and time.\par Examination of the lumbar spine demonstrates no tenderness, no deformity and no muscle spasm. Lumbar spine range of motion is 90° of flexion, 10° of right and left lateral flexion. Neurologic exam of the lower extremities reveals intact sensation to light touch. Motor function is 5 over 5 in all groups. Deep tendon reflexes are 2+ and equal at the knee and ankle. Plantar reflexes are normal. Straight leg raise test is negative bilaterally. Trendelenburg test is negative.\par There is minimal discomfort with rotation of both hips.  There is no lateral tenderness.  There is mild anterior tenderness of both hips with abduction.  Strength is intact throughout.  There is no pain with knee motion.  Both knees are stable.  The calves are soft and nontender. \par Both ankles are stable.  Both Achilles tendons are intact.  There is tenderness on the dorsum of the left foot at the tarsometatarsal junction with slight prominence of the bones.  There is no ankle instability.  The skin is intact.  There is no lymphedema. [de-identified] : AP, lateral and oblique x-rays of the left foot taken today demonstrate no fracture, no dislocation and no bony abnormality.  AP and lateral x-rays of both hips taken today demonstrate no fracture, no dislocation and no bony abnormality.

## 2022-11-28 NOTE — DISCUSSION/SUMMARY
[de-identified] : The patient has tenderness on the dorsum of her left foot.  I think this is most likely consistent with tendinitis versus shoe pressure on the tarsometatarsal joint.  I have discussed shoe modifications and appliances to unweight this area with the patient but she states this has already been done.  She does not appear to have any change in pain despite her shoe wear.  She has taken anti-inflammatories, she has had steroid injections and had an MRI which was apparently negative.  I have no other suggestions for her pain and recommend consultation with the foot and ankle service.\par In terms of her hips the pain is fairly mild at this time.  She may consider physical therapy or a course of anti-inflammatories.  She will try ibuprofen 3 times a day.  If symptoms worsen she will be reevaluated.

## 2022-11-28 NOTE — HISTORY OF PRESENT ILLNESS
[de-identified] : 27 year old female  presents for initial evaluation of bilateral hip pain x 6 months and left foot pain x 2 years. She denies any trauma or injury. She complains of intermittent dull pain over the lateral aspect of both hips, worse with standing from a prolonged seated position. She also complains of intermittent throbbing pain and pressure in the left foot, worse at night. Pain is worst over the dorsum of the foot. She has no pain with walking or weight bearing. She reports seeing podiatrist Dr. Oneal for her foot earlier in the year who took x-rays and was unsure what may be causing her pain. She had also received an injection in the foot with no relief. She has tried Tylenol and Advil with minimal relief. She denies paresthesias in the lower extremities. She reports previous injury to the left hip, had an injection, PT with great relief. She reports a history of thyroid cancer.

## 2022-11-29 ENCOUNTER — APPOINTMENT (OUTPATIENT)
Dept: FAMILY MEDICINE | Facility: CLINIC | Age: 27
End: 2022-11-29

## 2022-11-29 VITALS
HEIGHT: 58 IN | SYSTOLIC BLOOD PRESSURE: 128 MMHG | WEIGHT: 125 LBS | RESPIRATION RATE: 16 BRPM | HEART RATE: 86 BPM | TEMPERATURE: 98.1 F | BODY MASS INDEX: 26.24 KG/M2 | OXYGEN SATURATION: 98 % | DIASTOLIC BLOOD PRESSURE: 82 MMHG

## 2022-11-29 DIAGNOSIS — Z83.49 FAMILY HISTORY OF OTHER ENDOCRINE, NUTRITIONAL AND METABOLIC DISEASES: ICD-10-CM

## 2022-11-29 PROCEDURE — 99395 PREV VISIT EST AGE 18-39: CPT | Mod: 25

## 2022-11-29 RX ORDER — MUPIROCIN 20 MG/G
2 OINTMENT TOPICAL
Qty: 15 | Refills: 0 | Status: COMPLETED | COMMUNITY
Start: 2022-10-06

## 2022-11-29 RX ORDER — CETIRIZINE HYDROCHLORIDE 10 MG/1
10 TABLET, FILM COATED ORAL
Refills: 0 | Status: COMPLETED | COMMUNITY
End: 2022-11-29

## 2022-11-29 RX ORDER — LEVOTHYROXINE SODIUM 125 UG/1
125 CAPSULE ORAL
Qty: 90 | Refills: 0 | Status: ACTIVE | COMMUNITY
Start: 2022-08-10

## 2022-11-29 RX ORDER — TRIAMCINOLONE ACETONIDE 1 MG/G
0.1 OINTMENT TOPICAL
Qty: 80 | Refills: 0 | Status: COMPLETED | COMMUNITY
Start: 2022-10-06

## 2022-11-29 RX ORDER — LEVOTHYROXINE SODIUM 112 UG/1
112 CAPSULE ORAL
Qty: 90 | Refills: 0 | Status: COMPLETED | COMMUNITY
Start: 2021-10-28 | End: 2022-11-29

## 2022-11-29 RX ORDER — DEXTROAMPHETAMINE SACCHARATE, AMPHETAMINE ASPARTATE MONOHYDRATE, DEXTROAMPHETAMINE SULFATE AND AMPHETAMINE SULFATE 3.75; 3.75; 3.75; 3.75 MG/1; MG/1; MG/1; MG/1
15 CAPSULE, EXTENDED RELEASE ORAL
Qty: 30 | Refills: 0 | Status: COMPLETED | COMMUNITY
Start: 2022-07-13

## 2022-11-29 RX ORDER — DEXTROAMPHETAMINE SACCHARATE, AMPHETAMINE ASPARTATE MONOHYDRATE, DEXTROAMPHETAMINE SULFATE AND AMPHETAMINE SULFATE 2.5; 2.5; 2.5; 2.5 MG/1; MG/1; MG/1; MG/1
10 CAPSULE, EXTENDED RELEASE ORAL
Qty: 60 | Refills: 0 | Status: ACTIVE | COMMUNITY
Start: 2022-11-23

## 2022-11-29 RX ORDER — DEXTROAMPHETAMINE SACCHARATE, AMPHETAMINE ASPARTATE MONOHYDRATE, DEXTROAMPHETAMINE SULFATE AND AMPHETAMINE SULFATE 5; 5; 5; 5 MG/1; MG/1; MG/1; MG/1
20 CAPSULE, EXTENDED RELEASE ORAL
Qty: 30 | Refills: 0 | Status: COMPLETED | COMMUNITY
Start: 2022-10-24

## 2022-11-29 RX ORDER — METOPROLOL SUCCINATE 25 MG/1
25 TABLET, EXTENDED RELEASE ORAL DAILY
Qty: 45 | Refills: 1 | Status: COMPLETED | COMMUNITY
Start: 2022-07-20 | End: 2022-11-29

## 2022-12-03 ENCOUNTER — APPOINTMENT (OUTPATIENT)
Dept: ORTHOPEDIC SURGERY | Facility: CLINIC | Age: 27
End: 2022-12-03

## 2022-12-09 ENCOUNTER — APPOINTMENT (OUTPATIENT)
Dept: PULMONOLOGY | Facility: CLINIC | Age: 27
End: 2022-12-09

## 2022-12-09 NOTE — HISTORY OF PRESENT ILLNESS
[FreeTextEntry1] : CPE [de-identified] : overall is feeling well\par chronic pain in left foot pain \par still night sweats, still present \par endo increased thyroid medications\par seeing dec 2022\par denies any other associated symptoms\par denies any other complaints or concerns at this time

## 2022-12-09 NOTE — ASSESSMENT
[FreeTextEntry1] : VSS, exam normal\par c/w current management as per endo and ent\par form filled for work\par follow up in office if sx persists or worsens\par patient verbalizes understanding and is stable upon d/c\par

## 2022-12-09 NOTE — HEALTH RISK ASSESSMENT
[Very Good] : ~his/her~  mood as very good [Never] : Never [Yes] : Yes [2 - 4 times a month (2 pts)] : 2-4 times a month (2 points) [3 or 4 (1 pt)] : 3 or 4  (1 point) [Never (0 pts)] : Never (0 points) [No] : In the past 12 months have you used drugs other than those required for medical reasons? No [0] : 2) Feeling down, depressed, or hopeless: Not at all (0) [PHQ-2 Negative - No further assessment needed] : PHQ-2 Negative - No further assessment needed [Patient reported PAP Smear was normal] : Patient reported PAP Smear was normal [HIV test declined] : HIV test declined [Hepatitis C test declined] : Hepatitis C test declined [Alone] : lives alone [Employed] : employed [Feels Safe at Home] : Feels safe at home [Fully functional (bathing, dressing, toileting, transferring, walking, feeding)] : Fully functional (bathing, dressing, toileting, transferring, walking, feeding) [Fully functional (using the telephone, shopping, preparing meals, housekeeping, doing laundry, using] : Fully functional and needs no help or supervision to perform IADLs (using the telephone, shopping, preparing meals, housekeeping, doing laundry, using transportation, managing medications and managing finances) [FreeTextEntry1] : see above  [de-identified] : none  [de-identified] : endo, pulm, ortho [Audit-CScore] : 0 [de-identified] : dumbells and weights  [de-identified] : balanced; supplements- mg for sleep [[id number]] : 0 [Change in mental status noted] : No change in mental status noted [Language] : denies difficulty with language [Sexually Active] : not sexually active [High Risk Behavior] : no high risk behavior [Reports changes in hearing] : Reports no changes in hearing [Reports changes in vision] : Reports no changes in vision [Reports changes in dental health] : Reports no changes in dental health [PapSmearDate] : 2021 [FreeTextEntry2] :  for gregorio quintero

## 2023-02-12 NOTE — ASU PATIENT PROFILE, ADULT - CLICK TO LAUNCH ORM
Arrives to ED with c/o vaginal bleeding that began 30 minutes PTA. Approximately 12 weeks pregnant. . Reports 1 episode bright red blood, noted in toilet. Denies clots. Reports mucous.      Triage Assessment     Row Name [id number]       Triage Assessment (Adult)    Airway WDL WDL       Respiratory WDL    Respiratory WDL WDL       Skin Circulation/Temperature WDL    Skin Circulation/Temperature WDL WDL       Cardiac WDL    Cardiac WDL WDL       Peripheral/Neurovascular WDL    Peripheral Neurovascular WDL WDL       Cognitive/Neuro/Behavioral WDL    Cognitive/Neuro/Behavioral WDL WDL              
.

## 2023-02-18 ENCOUNTER — NON-APPOINTMENT (OUTPATIENT)
Age: 28
End: 2023-02-18

## 2023-02-22 ENCOUNTER — APPOINTMENT (OUTPATIENT)
Dept: FAMILY MEDICINE | Facility: CLINIC | Age: 28
End: 2023-02-22
Payer: COMMERCIAL

## 2023-02-22 PROCEDURE — 99214 OFFICE O/P EST MOD 30 MIN: CPT | Mod: 95

## 2023-02-22 NOTE — ASSESSMENT
[FreeTextEntry1] : reviewed symptoms with patient \par symptoms may be due to underlying anxiety and depression rather than adjustment of thyroid medication \par advised to follow up endo regarding medication adjustment and follow up labs\par patient hesitant to start daily medication at this time to treat anxiety and depression \par discussed SSRI options- defers at this time and would like to take something for insomnia as she feels that is the main reason for feeling depression symptoms \par advised to increase therapy visits to twice a month \par will start on trazodone prn \par medication as prescribed, medication education done \par follow up in 4-6 weeks\par follow up in office sooner if sx persists or worsens\par patient verbalizes understanding and is stable upon d/c\par

## 2023-02-22 NOTE — HISTORY OF PRESENT ILLNESS
[Home] : at home, [unfilled] , at the time of the visit. [Medical Office: ([facility])___] : at the medical office located in  [Verbal consent obtained from patient] : the patient, [unfilled] [FreeTextEntry8] : c/o feeling down and insomnia \par endo lowered Synthroid to 112 mcg \par feeling down and not sleeping \par blood work showed TSH going down to zero \par lessened the dosage about 3 weeks\par feels worse with lowering dose\par not sleeping well\par emotionally and mentally feeling worse \par feeling depressed \par denies any prior history of depression \par feeling more anxious \par + recurrent panic attacks around menses \par talking to a therapist now once a month which she lowered due to work which was weekly \par denies any si/hi\par denies any other associated symptoms\par denies any other complaints or concerns at this time

## 2023-02-22 NOTE — REVIEW OF SYSTEMS
[Fatigue] : fatigue [Insomnia] : insomnia [Anxiety] : anxiety [Depression] : depression [Negative] : Psychiatric

## 2023-03-29 ENCOUNTER — APPOINTMENT (OUTPATIENT)
Dept: FAMILY MEDICINE | Facility: CLINIC | Age: 28
End: 2023-03-29
Payer: COMMERCIAL

## 2023-03-29 VITALS
OXYGEN SATURATION: 99 % | SYSTOLIC BLOOD PRESSURE: 120 MMHG | RESPIRATION RATE: 6 BRPM | DIASTOLIC BLOOD PRESSURE: 80 MMHG | TEMPERATURE: 98.2 F | HEART RATE: 103 BPM

## 2023-03-29 DIAGNOSIS — Z87.898 PERSONAL HISTORY OF OTHER SPECIFIED CONDITIONS: ICD-10-CM

## 2023-03-29 DIAGNOSIS — G47.00 INSOMNIA, UNSPECIFIED: ICD-10-CM

## 2023-03-29 PROCEDURE — 99214 OFFICE O/P EST MOD 30 MIN: CPT

## 2023-03-30 ENCOUNTER — NON-APPOINTMENT (OUTPATIENT)
Age: 28
End: 2023-03-30

## 2023-03-31 PROBLEM — Z87.898 HISTORY OF DYSURIA: Status: RESOLVED | Noted: 2023-03-29 | Resolved: 2023-03-29

## 2023-03-31 PROBLEM — G47.00 INSOMNIA: Status: ACTIVE | Noted: 2019-05-15

## 2023-03-31 LAB
APPEARANCE: ABNORMAL
BACTERIA UR CULT: NORMAL
BACTERIA: NEGATIVE
BILIRUBIN URINE: NEGATIVE
BLOOD URINE: NEGATIVE
C TRACH RRNA SPEC QL NAA+PROBE: NOT DETECTED
COLOR: YELLOW
GLUCOSE QUALITATIVE U: NEGATIVE
HYALINE CASTS: 5 /LPF
KETONES URINE: NEGATIVE
LEUKOCYTE ESTERASE URINE: ABNORMAL
MICROSCOPIC-UA: NORMAL
N GONORRHOEA RRNA SPEC QL NAA+PROBE: NOT DETECTED
NITRITE URINE: NEGATIVE
PH URINE: 7.5
PROTEIN URINE: NORMAL
RED BLOOD CELLS URINE: 2 /HPF
SOURCE AMPLIFICATION: NORMAL
SPECIFIC GRAVITY URINE: 1.02
SQUAMOUS EPITHELIAL CELLS: 6 /HPF
UROBILINOGEN URINE: NORMAL
WHITE BLOOD CELLS URINE: 7 /HPF

## 2023-03-31 NOTE — ASSESSMENT
[FreeTextEntry1] : urine dipstick, small LE \par will follow up ua and ucx\par will treat clinically for uti \par medication as prescribed, medication education done \par VSS, exam normal\par advised to increase fluid intake, rest, and acetaminophen/ibuprofen prn pain/fever\par follow up in office sooner if sx persists or worsens\par patient verbalizes understanding and is stable upon d/c\par

## 2023-03-31 NOTE — HISTORY OF PRESENT ILLNESS
[FreeTextEntry8] : 29 yo f, pmhx of allergic asthma, thyroid cancer (s/p thyroidectomy, chemo and radiation), c/o dysuria\par symptoms for 1 day\par + frequency and urgency \par denies any vaginal discharge \par denies any fever or chills \par + back pain lower \par denies any self treatment  \par new sexual partner\par hx of uti in college \par denies any other associated symptoms\par denies any other complaints or concerns at this time

## 2023-04-14 ENCOUNTER — NON-APPOINTMENT (OUTPATIENT)
Age: 28
End: 2023-04-14

## 2023-05-10 ENCOUNTER — APPOINTMENT (OUTPATIENT)
Dept: PULMONOLOGY | Facility: CLINIC | Age: 28
End: 2023-05-10

## 2023-06-15 ENCOUNTER — APPOINTMENT (OUTPATIENT)
Dept: FAMILY MEDICINE | Facility: CLINIC | Age: 28
End: 2023-06-15
Payer: COMMERCIAL

## 2023-06-15 VITALS
OXYGEN SATURATION: 95 % | TEMPERATURE: 98.3 F | HEART RATE: 100 BPM | SYSTOLIC BLOOD PRESSURE: 120 MMHG | RESPIRATION RATE: 16 BRPM | DIASTOLIC BLOOD PRESSURE: 60 MMHG

## 2023-06-15 PROCEDURE — 99214 OFFICE O/P EST MOD 30 MIN: CPT

## 2023-06-19 NOTE — PHYSICAL EXAM
[Normal] : normal rate, regular rhythm, normal S1 and S2 and no murmur heard [de-identified] : + tenderness over right side of jaw, inflamed gums on right side

## 2023-06-19 NOTE — HISTORY OF PRESENT ILLNESS
[FreeTextEntry8] : 27 yo f, pmhx of allergic asthma, thyroid cancer (s/p thyroidectomy, chemo and radiation), c/o pain on right side of mouth \par localized to right upper gum \par taking advil and tylenol prn\par pain and congestion as well \par pain with eating\par hard to chew on right \par seen by dentist prior who recommended may need to have implant in area\par denies any fever or chills \par denies any other associated symptoms\par denies any other complaints or concerns at this time

## 2023-06-19 NOTE — ASSESSMENT
[FreeTextEntry1] : VSS \par medication as prescribed, medication education done \par advised to increase fluid intake, rest, and acetaminophen/ibuprofen prn pain/fever\par advised follow up with dentist \par follow up in office if sx persists or worsens\par patient verbalizes understanding and is stable upon d/c\par

## 2023-07-05 ENCOUNTER — APPOINTMENT (OUTPATIENT)
Dept: CT IMAGING | Facility: CLINIC | Age: 28
End: 2023-07-05
Payer: COMMERCIAL

## 2023-07-05 ENCOUNTER — OUTPATIENT (OUTPATIENT)
Dept: OUTPATIENT SERVICES | Facility: HOSPITAL | Age: 28
LOS: 1 days | End: 2023-07-05
Payer: COMMERCIAL

## 2023-07-05 DIAGNOSIS — R93.89 ABNORMAL FINDINGS ON DIAGNOSTIC IMAGING OF OTHER SPECIFIED BODY STRUCTURES: ICD-10-CM

## 2023-07-05 DIAGNOSIS — R91.8 OTHER NONSPECIFIC ABNORMAL FINDING OF LUNG FIELD: ICD-10-CM

## 2023-07-05 DIAGNOSIS — Z00.8 ENCOUNTER FOR OTHER GENERAL EXAMINATION: ICD-10-CM

## 2023-07-05 DIAGNOSIS — E89.0 POSTPROCEDURAL HYPOTHYROIDISM: Chronic | ICD-10-CM

## 2023-07-05 PROCEDURE — 71250 CT THORAX DX C-: CPT

## 2023-07-05 PROCEDURE — 71250 CT THORAX DX C-: CPT | Mod: 26

## 2023-07-07 ENCOUNTER — APPOINTMENT (OUTPATIENT)
Dept: PULMONOLOGY | Facility: CLINIC | Age: 28
End: 2023-07-07

## 2023-07-16 ENCOUNTER — TRANSCRIPTION ENCOUNTER (OUTPATIENT)
Age: 28
End: 2023-07-16

## 2023-08-24 ENCOUNTER — APPOINTMENT (OUTPATIENT)
Dept: FAMILY MEDICINE | Facility: CLINIC | Age: 28
End: 2023-08-24

## 2023-09-06 ENCOUNTER — APPOINTMENT (OUTPATIENT)
Dept: FAMILY MEDICINE | Facility: CLINIC | Age: 28
End: 2023-09-06
Payer: COMMERCIAL

## 2023-09-06 ENCOUNTER — RESULT REVIEW (OUTPATIENT)
Age: 28
End: 2023-09-06

## 2023-09-06 VITALS
OXYGEN SATURATION: 99 % | HEART RATE: 80 BPM | DIASTOLIC BLOOD PRESSURE: 75 MMHG | WEIGHT: 125 LBS | BODY MASS INDEX: 26.24 KG/M2 | HEIGHT: 58 IN | TEMPERATURE: 98.9 F | SYSTOLIC BLOOD PRESSURE: 115 MMHG | RESPIRATION RATE: 16 BRPM

## 2023-09-06 PROCEDURE — 81003 URINALYSIS AUTO W/O SCOPE: CPT | Mod: QW

## 2023-09-06 PROCEDURE — 99214 OFFICE O/P EST MOD 30 MIN: CPT | Mod: 25

## 2023-09-06 NOTE — HISTORY OF PRESENT ILLNESS
[FreeTextEntry8] : 29 yo f, pmhx of allergic asthma, thyroid cancer (s/p thyroidectomy, chemo and radiation), c/o fever  last night fever 101, 103 body aches and chills right flank pain  + nausea since monday  denies any vomiting denies any dysuria burning with holding urine  + bloating with urinary symptoms  LMP 2 weeks ago saw gyn last week  seen by GI 1 year ago  denies any other associated symptoms denies any other complaints or concerns at this time

## 2023-09-07 LAB
APPEARANCE: CLEAR
BACTERIA: NEGATIVE /HPF
BILIRUBIN URINE: NEGATIVE
BLOOD URINE: NEGATIVE
CAST: 0 /LPF
COLOR: YELLOW
EPITHELIAL CELLS: 0 /HPF
GLUCOSE QUALITATIVE U: NEGATIVE MG/DL
KETONES URINE: NEGATIVE MG/DL
LEUKOCYTE ESTERASE URINE: NEGATIVE
MICROSCOPIC-UA: NORMAL
NITRITE URINE: NEGATIVE
PH URINE: 7.5
PROTEIN URINE: NEGATIVE MG/DL
RED BLOOD CELLS URINE: 0 /HPF
SPECIFIC GRAVITY URINE: 1
UROBILINOGEN URINE: 0.2 MG/DL
WHITE BLOOD CELLS URINE: 0 /HPF

## 2023-09-08 LAB — BACTERIA UR CULT: NORMAL

## 2023-09-11 RX ORDER — NITROFURANTOIN (MONOHYDRATE/MACROCRYSTALS) 25; 75 MG/1; MG/1
100 CAPSULE ORAL TWICE DAILY
Qty: 14 | Refills: 0 | Status: COMPLETED | COMMUNITY
Start: 2023-03-29 | End: 2023-09-11

## 2023-09-11 RX ORDER — OXYCODONE AND ACETAMINOPHEN 5; 325 MG/1; MG/1
5-325 TABLET ORAL
Qty: 8 | Refills: 0 | Status: COMPLETED | COMMUNITY
Start: 2023-06-21

## 2023-09-11 RX ORDER — CLINDAMYCIN HYDROCHLORIDE 150 MG/1
150 CAPSULE ORAL
Qty: 90 | Refills: 0 | Status: COMPLETED | COMMUNITY
Start: 2023-06-17

## 2023-09-11 RX ORDER — SULFAMETHOXAZOLE AND TRIMETHOPRIM 800; 160 MG/1; MG/1
800-160 TABLET ORAL
Qty: 20 | Refills: 0 | Status: COMPLETED | COMMUNITY
Start: 2023-03-31

## 2023-09-11 RX ORDER — PREDNISONE 10 MG/1
10 TABLET ORAL DAILY
Qty: 10 | Refills: 1 | Status: COMPLETED | COMMUNITY
Start: 2023-05-31 | End: 2023-09-11

## 2023-09-13 ENCOUNTER — NON-APPOINTMENT (OUTPATIENT)
Age: 28
End: 2023-09-13

## 2023-09-23 ENCOUNTER — APPOINTMENT (OUTPATIENT)
Dept: ULTRASOUND IMAGING | Facility: CLINIC | Age: 28
End: 2023-09-23
Payer: COMMERCIAL

## 2023-09-23 ENCOUNTER — RESULT REVIEW (OUTPATIENT)
Age: 28
End: 2023-09-23

## 2023-09-23 ENCOUNTER — OUTPATIENT (OUTPATIENT)
Dept: OUTPATIENT SERVICES | Facility: HOSPITAL | Age: 28
LOS: 1 days | End: 2023-09-23
Payer: COMMERCIAL

## 2023-09-23 DIAGNOSIS — R14.0 ABDOMINAL DISTENSION (GASEOUS): ICD-10-CM

## 2023-09-23 DIAGNOSIS — E89.0 POSTPROCEDURAL HYPOTHYROIDISM: Chronic | ICD-10-CM

## 2023-09-23 DIAGNOSIS — R10.9 UNSPECIFIED ABDOMINAL PAIN: ICD-10-CM

## 2023-09-23 PROCEDURE — 76700 US EXAM ABDOM COMPLETE: CPT | Mod: 26

## 2023-09-23 PROCEDURE — 76830 TRANSVAGINAL US NON-OB: CPT | Mod: 26

## 2023-09-23 PROCEDURE — 76857 US EXAM PELVIC LIMITED: CPT | Mod: 26,59

## 2023-09-23 PROCEDURE — 76857 US EXAM PELVIC LIMITED: CPT

## 2023-09-23 PROCEDURE — 76856 US EXAM PELVIC COMPLETE: CPT | Mod: 26

## 2023-09-23 PROCEDURE — 76700 US EXAM ABDOM COMPLETE: CPT

## 2023-09-23 PROCEDURE — 76830 TRANSVAGINAL US NON-OB: CPT

## 2023-09-23 PROCEDURE — 76856 US EXAM PELVIC COMPLETE: CPT

## 2023-12-12 ENCOUNTER — NON-APPOINTMENT (OUTPATIENT)
Age: 28
End: 2023-12-12

## 2023-12-14 ENCOUNTER — APPOINTMENT (OUTPATIENT)
Dept: FAMILY MEDICINE | Facility: CLINIC | Age: 28
End: 2023-12-14
Payer: COMMERCIAL

## 2023-12-14 VITALS
TEMPERATURE: 98 F | DIASTOLIC BLOOD PRESSURE: 77 MMHG | HEART RATE: 80 BPM | RESPIRATION RATE: 16 BRPM | OXYGEN SATURATION: 99 % | SYSTOLIC BLOOD PRESSURE: 110 MMHG

## 2023-12-14 PROCEDURE — 99214 OFFICE O/P EST MOD 30 MIN: CPT

## 2023-12-14 RX ORDER — CIPROFLOXACIN HYDROCHLORIDE 500 MG/1
500 TABLET, FILM COATED ORAL
Qty: 14 | Refills: 0 | Status: COMPLETED | COMMUNITY
Start: 2023-09-06 | End: 2023-12-14

## 2023-12-15 LAB
INFLUENZA A RESULT: NOT DETECTED
INFLUENZA B RESULT: NOT DETECTED
RESP SYN VIRUS RESULT: NOT DETECTED
SARS-COV-2 RESULT: NOT DETECTED

## 2023-12-26 NOTE — PHYSICAL EXAM
[Normal] : no posterior cervical lymphadenopathy and no anterior cervical lymphadenopathy [de-identified] : post nasal drip [de-identified] : + transmitted upper airway sounds with end expiratory wheezing

## 2023-12-26 NOTE — HISTORY OF PRESENT ILLNESS
[FreeTextEntry8] : c/o cough and congestion temp of 100.3 on tues sx have been ongoing since 2 days tylenol and advil did a cxr which is negative  symtoms include: cough and congestion  denies any sob  doing nebs BID dulera BID  Sick contacts- coworker recent travel- none self treatment- see above  tested prior- covid and flu test  vaccine status- none denies any other associated symptoms  denies any other complaints or concerns at this time

## 2023-12-26 NOTE — ASSESSMENT
[FreeTextEntry1] : VSS will start on prednisone taper  advised of asthma action plan advised albuterol q 4-6 hours x 1 day followed by 4-6 hours prn the following days  advised to hold off on starting steroid until done with prednisone follow up in 1 week for asthma reevaluation patient verbalizes understanding is stable upon d/c

## 2024-01-03 ENCOUNTER — NON-APPOINTMENT (OUTPATIENT)
Age: 29
End: 2024-01-03

## 2024-01-22 ENCOUNTER — APPOINTMENT (OUTPATIENT)
Dept: FAMILY MEDICINE | Facility: CLINIC | Age: 29
End: 2024-01-22
Payer: COMMERCIAL

## 2024-01-22 VITALS
HEIGHT: 58 IN | SYSTOLIC BLOOD PRESSURE: 104 MMHG | RESPIRATION RATE: 16 BRPM | HEART RATE: 73 BPM | WEIGHT: 120 LBS | DIASTOLIC BLOOD PRESSURE: 67 MMHG | TEMPERATURE: 98 F | BODY MASS INDEX: 25.19 KG/M2 | OXYGEN SATURATION: 95 %

## 2024-01-22 DIAGNOSIS — Z23 ENCOUNTER FOR IMMUNIZATION: ICD-10-CM

## 2024-01-22 DIAGNOSIS — Z85.850 PERSONAL HISTORY OF MALIGNANT NEOPLASM OF THYROID: ICD-10-CM

## 2024-01-22 DIAGNOSIS — K04.7 PERIAPICAL ABSCESS W/OUT SINUS: ICD-10-CM

## 2024-01-22 DIAGNOSIS — G43.909 MIGRAINE, UNSPECIFIED, NOT INTRACTABLE, W/OUT STATUS MIGRAINOSUS: ICD-10-CM

## 2024-01-22 DIAGNOSIS — M25.551 PAIN IN RIGHT HIP: ICD-10-CM

## 2024-01-22 DIAGNOSIS — R41.89 OTHER SYMPTOMS AND SIGNS INVOLVING COGNITIVE FUNCTIONS AND AWARENESS: ICD-10-CM

## 2024-01-22 DIAGNOSIS — F90.9 ATTENTION-DEFICIT HYPERACTIVITY DISORDER, UNSPECIFIED TYPE: ICD-10-CM

## 2024-01-22 DIAGNOSIS — Z86.79 PERSONAL HISTORY OF OTHER DISEASES OF THE CIRCULATORY SYSTEM: ICD-10-CM

## 2024-01-22 DIAGNOSIS — M25.50 PAIN IN UNSPECIFIED JOINT: ICD-10-CM

## 2024-01-22 DIAGNOSIS — J34.89 OTHER SPECIFIED DISORDERS OF NOSE AND NASAL SINUSES: ICD-10-CM

## 2024-01-22 DIAGNOSIS — J45.31 MILD PERSISTENT ASTHMA WITH (ACUTE) EXACERBATION: ICD-10-CM

## 2024-01-22 DIAGNOSIS — Z87.898 PERSONAL HISTORY OF OTHER SPECIFIED CONDITIONS: ICD-10-CM

## 2024-01-22 DIAGNOSIS — R14.0 ABDOMINAL DISTENSION (GASEOUS): ICD-10-CM

## 2024-01-22 DIAGNOSIS — R93.89 ABNORMAL FINDINGS ON DIAGNOSTIC IMAGING OF OTHER SPECIFIED BODY STRUCTURES: ICD-10-CM

## 2024-01-22 DIAGNOSIS — H51.8 OTHER SPECIFIED DISORDERS OF BINOCULAR MOVEMENT: ICD-10-CM

## 2024-01-22 DIAGNOSIS — R41.3 OTHER AMNESIA: ICD-10-CM

## 2024-01-22 DIAGNOSIS — F41.9 ANXIETY DISORDER, UNSPECIFIED: ICD-10-CM

## 2024-01-22 DIAGNOSIS — L65.9 NONSCARRING HAIR LOSS, UNSPECIFIED: ICD-10-CM

## 2024-01-22 DIAGNOSIS — F32.A ANXIETY DISORDER, UNSPECIFIED: ICD-10-CM

## 2024-01-22 DIAGNOSIS — Z86.39 PERSONAL HISTORY OF OTHER ENDOCRINE, NUTRITIONAL AND METABOLIC DISEASE: ICD-10-CM

## 2024-01-22 DIAGNOSIS — Z87.01 PERSONAL HISTORY OF PNEUMONIA (RECURRENT): ICD-10-CM

## 2024-01-22 DIAGNOSIS — M79.673 PAIN IN UNSPECIFIED FOOT: ICD-10-CM

## 2024-01-22 DIAGNOSIS — M25.552 PAIN IN LEFT HIP: ICD-10-CM

## 2024-01-22 DIAGNOSIS — U09.9 POST COVID-19 CONDITION, UNSPECIFIED: ICD-10-CM

## 2024-01-22 DIAGNOSIS — Z87.09 PERSONAL HISTORY OF OTHER DISEASES OF THE RESPIRATORY SYSTEM: ICD-10-CM

## 2024-01-22 DIAGNOSIS — F40.248 OTHER SITUATIONAL TYPE PHOBIA: ICD-10-CM

## 2024-01-22 DIAGNOSIS — Z87.39 PERSONAL HISTORY OF OTHER DISEASES OF THE MUSCULOSKELETAL SYSTEM AND CONNECTIVE TISSUE: ICD-10-CM

## 2024-01-22 DIAGNOSIS — Z00.00 ENCOUNTER FOR GENERAL ADULT MEDICAL EXAMINATION W/OUT ABNORMAL FINDINGS: ICD-10-CM

## 2024-01-22 PROCEDURE — 99395 PREV VISIT EST AGE 18-39: CPT | Mod: 25

## 2024-01-22 PROCEDURE — 86580 TB INTRADERMAL TEST: CPT

## 2024-01-22 RX ORDER — PREDNISONE 20 MG/1
20 TABLET ORAL
Qty: 13 | Refills: 0 | Status: COMPLETED | COMMUNITY
Start: 2023-12-14 | End: 2024-01-22

## 2024-01-22 RX ORDER — LEVOTHYROXINE SODIUM 13 UG/1
13 CAPSULE ORAL
Qty: 24 | Refills: 0 | Status: COMPLETED | COMMUNITY
Start: 2022-08-10 | End: 2024-01-22

## 2024-01-22 NOTE — PHYSICAL EXAM
[Well Nourished] : well nourished [No Acute Distress] : no acute distress [Well Developed] : well developed [Well-Appearing] : well-appearing [Normal Sclera/Conjunctiva] : normal sclera/conjunctiva [PERRL] : pupils equal round and reactive to light [EOMI] : extraocular movements intact [Normal Outer Ear/Nose] : the outer ears and nose were normal in appearance [Normal Oropharynx] : the oropharynx was normal [No JVD] : no jugular venous distention [No Lymphadenopathy] : no lymphadenopathy [Supple] : supple [Thyroid Normal, No Nodules] : the thyroid was normal and there were no nodules present [No Respiratory Distress] : no respiratory distress  [No Accessory Muscle Use] : no accessory muscle use [Clear to Auscultation] : lungs were clear to auscultation bilaterally [Regular Rhythm] : with a regular rhythm [Normal Rate] : normal rate  [Normal S1, S2] : normal S1 and S2 [No Murmur] : no murmur heard [No Carotid Bruits] : no carotid bruits [No Abdominal Bruit] : a ~M bruit was not heard ~T in the abdomen [No Varicosities] : no varicosities [Pedal Pulses Present] : the pedal pulses are present [No Palpable Aorta] : no palpable aorta [No Edema] : there was no peripheral edema [No Extremity Clubbing/Cyanosis] : no extremity clubbing/cyanosis [Soft] : abdomen soft [Non Tender] : non-tender [Non-distended] : non-distended [No Masses] : no abdominal mass palpated [No HSM] : no HSM [Normal Bowel Sounds] : normal bowel sounds [Normal Posterior Cervical Nodes] : no posterior cervical lymphadenopathy [Normal Anterior Cervical Nodes] : no anterior cervical lymphadenopathy [No CVA Tenderness] : no CVA  tenderness [No Spinal Tenderness] : no spinal tenderness [No Joint Swelling] : no joint swelling [Grossly Normal Strength/Tone] : grossly normal strength/tone [No Rash] : no rash [Coordination Grossly Intact] : coordination grossly intact [No Focal Deficits] : no focal deficits [Normal Gait] : normal gait [Deep Tendon Reflexes (DTR)] : deep tendon reflexes were 2+ and symmetric [Normal Affect] : the affect was normal [Normal Insight/Judgement] : insight and judgment were intact

## 2024-01-23 ENCOUNTER — NON-APPOINTMENT (OUTPATIENT)
Age: 29
End: 2024-01-23

## 2024-01-23 NOTE — HISTORY OF PRESENT ILLNESS
[FreeTextEntry1] : CPE [de-identified] : 29 yo f, pmhx of thyroid cancer s/p total thyroidectomy, now hypothyroidism, here for CPE  feeling body aches and feeling weak on and off since last year  has seen rheum, ID- all normal work seeing endo next month for followup  feeling better when tirosint is increased but symptoms come and go as per rheum note: "Diffuse myalgias, fatigue (overwhelming) and dyscognition with hx migraines and IBS. NRS- considerable issue w/ sleep over past few mnths, trouble falling/ staying asleep, rarely more than 3 hs consecutively unless hasn't slept in days then sleeping 12+ hours. IBS: C/D: controlled Migraines: worse prior to thyroidectomy with diffuse prominent lymphadenopathy R side, all much improved. ADHD: stable, tried to hold adderall after thyroidectomy but fatigue too overwhelming Paresthesia: L arm numbness (not tingling).. in hand and arm/ radiating to entire LLE. NO weakness, Working w/ neuro (Dr. Brothers) MRA neg for MS / CVA.. note prior to surgery, unclear if present before COVID... Alopecia: considerable past few mnth Rash: migratory lesions x many ys...precise etiology unclear in past ROS: + oral sicca, low grade temp  intermittent flulike sx several times/ yr lasting 1-3 days (misses work when active), NO raynauds (but hand pain in cold) - failed melatonin in past - endoscopy/ colonoscopy 10 ys ago negative.. no evidence of inflammatory bowel disease.    Hx suggestive of possible CTD/ AI condition but also consider dramatic changes in past few mnths- COVID infection followed by thyroid cancer with localized advanced lymphadenopathy..... Many of complaints preceed thyroid ca dx: flulike sx, fatigue, myalgias, hair loss, rash.. but functionally doing well prior to COVID. Will get rheum studies now but focus needs to be on attention to sleep, conditioning" spoke to work, looking to change schedule to 4 days a week alleviate stress and increase rest and recovery denies any other associated symptoms denies any other complaints or concerns at this time

## 2024-01-23 NOTE — ASSESSMENT
[FreeTextEntry1] : Routine medical examination VSS- exam normal  c/w current medications Advised healthy diet and exercise referred to oncology and neurology due to persistent symptoms form filled out for patient  patient verbalizes understanding and patient is stable upon discharge

## 2024-01-23 NOTE — HEALTH RISK ASSESSMENT
[Very Good] : ~his/her~  mood as very good [Yes] : Yes [Never (0 pts)] : Never (0 points) [No] : In the past 12 months have you used drugs other than those required for medical reasons? No [0] : 2) Feeling down, depressed, or hopeless: Not at all (0) [PHQ-2 Negative - No further assessment needed] : PHQ-2 Negative - No further assessment needed [Patient reported PAP Smear was normal] : Patient reported PAP Smear was normal [HIV test declined] : HIV test declined [Hepatitis C test declined] : Hepatitis C test declined [Employed] : employed [Feels Safe at Home] : Feels safe at home [Fully functional (bathing, dressing, toileting, transferring, walking, feeding)] : Fully functional (bathing, dressing, toileting, transferring, walking, feeding) [Fully functional (using the telephone, shopping, preparing meals, housekeeping, doing laundry, using] : Fully functional and needs no help or supervision to perform IADLs (using the telephone, shopping, preparing meals, housekeeping, doing laundry, using transportation, managing medications and managing finances) [Monthly or less (1 pt)] : Monthly or less (1 point) [1 or 2 (0 pts)] : 1 or 2 (0 points) [Change in mental status noted] : Change in mental status noted [None] : None [With Significant Other] : lives with significant other [Significant Other] : lives with significant other [Sexually Active] : sexually active [Never] : Never [FreeTextEntry1] : ^ see above  [de-identified] : endo, pulm, ID, rheum, psychiatrist  [de-identified] : cheek abscess over the summer [de-identified] : not able to tolerate  [Audit-CScore] : 1 [de-identified] : balanced; no gluten; supplements- vitamin d, mvn, mg for sleep [de-identified] : anxiety around menses  [[id number]] : 0 [Language] : denies difficulty with language [Reports changes in hearing] : Reports no changes in hearing [High Risk Behavior] : no high risk behavior [Reports changes in dental health] : Reports no changes in dental health [Reports changes in vision] : Reports no changes in vision [PapSmearDate] : 2021 [PapSmearComments] : advised to follow up with gyn  [FreeTextEntry2] :  for gregorio quintero

## 2024-01-24 ENCOUNTER — NON-APPOINTMENT (OUTPATIENT)
Age: 29
End: 2024-01-24

## 2024-02-14 DIAGNOSIS — R53.83 OTHER MALAISE: ICD-10-CM

## 2024-02-14 DIAGNOSIS — R61 GENERALIZED HYPERHIDROSIS: ICD-10-CM

## 2024-02-14 DIAGNOSIS — R53.81 OTHER MALAISE: ICD-10-CM

## 2024-02-25 ENCOUNTER — NON-APPOINTMENT (OUTPATIENT)
Age: 29
End: 2024-02-25

## 2024-03-19 ENCOUNTER — APPOINTMENT (OUTPATIENT)
Dept: FAMILY MEDICINE | Facility: CLINIC | Age: 29
End: 2024-03-19
Payer: COMMERCIAL

## 2024-03-19 VITALS
RESPIRATION RATE: 16 BRPM | SYSTOLIC BLOOD PRESSURE: 110 MMHG | DIASTOLIC BLOOD PRESSURE: 70 MMHG | OXYGEN SATURATION: 95 % | TEMPERATURE: 98.1 F | HEART RATE: 103 BPM

## 2024-03-19 DIAGNOSIS — C73 MALIGNANT NEOPLASM OF THYROID GLAND: ICD-10-CM

## 2024-03-19 DIAGNOSIS — J45.909 UNSPECIFIED ASTHMA, UNCOMPLICATED: ICD-10-CM

## 2024-03-19 DIAGNOSIS — J30.89 OTHER ALLERGIC RHINITIS: ICD-10-CM

## 2024-03-19 DIAGNOSIS — J34.89 OTHER SPECIFIED DISORDERS OF NOSE AND NASAL SINUSES: ICD-10-CM

## 2024-03-19 PROCEDURE — 99214 OFFICE O/P EST MOD 30 MIN: CPT

## 2024-03-19 RX ORDER — AZELASTINE HYDROCHLORIDE 137 UG/1
137 SPRAY, METERED NASAL TWICE DAILY
Qty: 1 | Refills: 1 | Status: ACTIVE | COMMUNITY
Start: 2024-03-19 | End: 1900-01-01

## 2024-03-19 RX ORDER — LIOTHYRONINE SODIUM 5 UG/1
5 TABLET ORAL
Refills: 0 | Status: ACTIVE | COMMUNITY

## 2024-03-19 RX ORDER — DOXYCYCLINE 100 MG/1
100 CAPSULE ORAL TWICE DAILY
Qty: 14 | Refills: 0 | Status: ACTIVE | COMMUNITY
Start: 2021-11-24 | End: 1900-01-01

## 2024-03-19 NOTE — ASSESSMENT
[FreeTextEntry1] : VSS will follow up covid pcr symptoms likely due to allergic rhinitis  advised zyrtec, flonase and azelastine trial  medication as prescribed, medication education done  advised to increase fluid intake, rest, and acetaminophen/ibuprofen prn pain/fever consider antibiotics if symptoms persists  follow up in office if sx persists or worsens patient verbalizes understanding and is stable upon d/c

## 2024-03-19 NOTE — HISTORY OF PRESENT ILLNESS
[FreeTextEntry8] : 30 yo f, pmhx of thyroid cancer s/p total thyroidectomy, now hypothyroidism, c/o allergic rhinitis c/o cough and congestion sx have been ongoing since friday  symtoms include: congestion, sinus pressure  denies any fever or chills Sick contacts- none recent travel- none self treatment- dulera and flonase tested prior- did not test, had covid 1 year ago  vaccine status- none denies any other associated symptoms  denies any other complaints or concerns at this time

## 2024-03-19 NOTE — CURRENT MEDS
Diagnosed with Epilepsy in June, on Keppra 2x daily. Had seizure this morning. Pt. Does have some pain on left and right side head above eyes.      Triage Assessment     Row Name 10/07/22 0840       Triage Assessment (Adult)    Airway WDL WDL       Respiratory WDL    Respiratory WDL WDL       Skin Circulation/Temperature WDL    Skin Circulation/Temperature WDL WDL       Cardiac WDL    Cardiac WDL WDL       Peripheral/Neurovascular WDL    Peripheral Neurovascular WDL WDL       Cognitive/Neuro/Behavioral WDL    Cognitive/Neuro/Behavioral WDL WDL               [Takes medication as prescribed] : takes

## 2024-03-19 NOTE — PHYSICAL EXAM
[Normal] : no posterior cervical lymphadenopathy and no anterior cervical lymphadenopathy [de-identified] : + maxillary sinus tenderness [de-identified] : + transmitted upper airway sounds

## 2024-03-20 LAB — SARS-COV-2 N GENE NPH QL NAA+PROBE: NOT DETECTED

## 2024-03-24 ENCOUNTER — NON-APPOINTMENT (OUTPATIENT)
Age: 29
End: 2024-03-24

## 2024-06-10 ENCOUNTER — NON-APPOINTMENT (OUTPATIENT)
Age: 29
End: 2024-06-10

## 2024-06-26 RX ORDER — TRAZODONE HYDROCHLORIDE 50 MG/1
50 TABLET ORAL
Qty: 90 | Refills: 1 | Status: ACTIVE | COMMUNITY
Start: 2023-02-22 | End: 1900-01-01

## 2024-06-26 RX ORDER — MOMETASONE FUROATE AND FORMOTEROL FUMARATE DIHYDRATE 200; 5 UG/1; UG/1
200-5 AEROSOL RESPIRATORY (INHALATION) TWICE DAILY
Qty: 1 | Refills: 3 | Status: ACTIVE | COMMUNITY
Start: 2022-01-10 | End: 1900-01-01

## 2024-07-30 ENCOUNTER — APPOINTMENT (OUTPATIENT)
Dept: PULMONOLOGY | Facility: CLINIC | Age: 29
End: 2024-07-30

## 2024-08-20 ENCOUNTER — APPOINTMENT (OUTPATIENT)
Dept: FAMILY MEDICINE | Facility: CLINIC | Age: 29
End: 2024-08-20
Payer: COMMERCIAL

## 2024-08-20 VITALS
RESPIRATION RATE: 16 BRPM | DIASTOLIC BLOOD PRESSURE: 70 MMHG | BODY MASS INDEX: 26.45 KG/M2 | TEMPERATURE: 97.3 F | HEIGHT: 58 IN | HEART RATE: 96 BPM | WEIGHT: 126 LBS | SYSTOLIC BLOOD PRESSURE: 115 MMHG | OXYGEN SATURATION: 98 %

## 2024-08-20 DIAGNOSIS — C73 MALIGNANT NEOPLASM OF THYROID GLAND: ICD-10-CM

## 2024-08-20 DIAGNOSIS — J45.909 UNSPECIFIED ASTHMA, UNCOMPLICATED: ICD-10-CM

## 2024-08-20 DIAGNOSIS — J34.89 OTHER SPECIFIED DISORDERS OF NOSE AND NASAL SINUSES: ICD-10-CM

## 2024-08-20 DIAGNOSIS — J30.89 OTHER ALLERGIC RHINITIS: ICD-10-CM

## 2024-08-20 PROCEDURE — G2211 COMPLEX E/M VISIT ADD ON: CPT

## 2024-08-20 PROCEDURE — 99214 OFFICE O/P EST MOD 30 MIN: CPT

## 2024-08-20 RX ORDER — ALBUTEROL SULFATE 90 UG/1
108 (90 BASE) INHALANT RESPIRATORY (INHALATION)
Qty: 1 | Refills: 2 | Status: ACTIVE | COMMUNITY
Start: 2024-08-20 | End: 1900-01-01

## 2024-08-26 NOTE — HISTORY OF PRESENT ILLNESS
[FreeTextEntry8] : 30 yo f, pmhx of thyroid cancer s/p total thyroidectomy, now hypothyroidism, c/o allergic rhinitis c/o cough and congestion has been out of asthma meds for the past 4 months in the past 2 months worsening asthma symptoms has been using nebulizer once daily for the past 3 weeks denies any fever or chills cousin in the hospital with pna  patient will be traveling on saturday  sx have been ongoing since friday  denies any sinus pressure  denies any fever or chills vaccine status- none denies any other associated symptoms  denies any other complaints or concerns at this time

## 2024-08-26 NOTE — ASSESSMENT
[FreeTextEntry1] : VSS symptoms likely due to allergic rhinitis  advised zyrtec, flonase and azelastine trial  medication as prescribed, medication education done  advised to increase fluid intake, rest, and acetaminophen/ibuprofen prn pain/fever consider antibiotics if symptoms persists  follow up in office if sx persists or worsens patient verbalizes understanding and is stable upon d/c

## 2024-08-26 NOTE — PHYSICAL EXAM
[Normal] : no posterior cervical lymphadenopathy and no anterior cervical lymphadenopathy [de-identified] : + maxillary sinus tenderness [de-identified] : + transmitted upper airway sounds

## 2024-08-26 NOTE — HISTORY OF PRESENT ILLNESS
[FreeTextEntry8] : 28 yo f, pmhx of thyroid cancer s/p total thyroidectomy, now hypothyroidism, c/o allergic rhinitis c/o cough and congestion has been out of asthma meds for the past 4 months in the past 2 months worsening asthma symptoms has been using nebulizer once daily for the past 3 weeks denies any fever or chills cousin in the hospital with pna  patient will be traveling on saturday  sx have been ongoing since friday  denies any sinus pressure  denies any fever or chills vaccine status- none denies any other associated symptoms  denies any other complaints or concerns at this time

## 2024-08-26 NOTE — PHYSICAL EXAM
[Normal] : no posterior cervical lymphadenopathy and no anterior cervical lymphadenopathy [de-identified] : + maxillary sinus tenderness [de-identified] : + transmitted upper airway sounds

## 2024-09-24 ENCOUNTER — APPOINTMENT (OUTPATIENT)
Dept: PULMONOLOGY | Facility: CLINIC | Age: 29
End: 2024-09-24
Payer: COMMERCIAL

## 2024-09-24 VITALS
WEIGHT: 125 LBS | RESPIRATION RATE: 16 BRPM | HEIGHT: 58 IN | SYSTOLIC BLOOD PRESSURE: 126 MMHG | BODY MASS INDEX: 26.24 KG/M2 | TEMPERATURE: 98 F | OXYGEN SATURATION: 97 % | HEART RATE: 78 BPM | DIASTOLIC BLOOD PRESSURE: 80 MMHG

## 2024-09-24 DIAGNOSIS — K21.9 GASTRO-ESOPHAGEAL REFLUX DISEASE W/OUT ESOPHAGITIS: ICD-10-CM

## 2024-09-24 DIAGNOSIS — R06.02 SHORTNESS OF BREATH: ICD-10-CM

## 2024-09-24 DIAGNOSIS — Z82.69 FAMILY HISTORY OF OTHER DISEASES OF THE MUSCULOSKELETAL SYSTEM AND CONNECTIVE TISSUE: ICD-10-CM

## 2024-09-24 DIAGNOSIS — Z82.61 FAMILY HISTORY OF ARTHRITIS: ICD-10-CM

## 2024-09-24 DIAGNOSIS — R93.89 ABNORMAL FINDINGS ON DIAGNOSTIC IMAGING OF OTHER SPECIFIED BODY STRUCTURES: ICD-10-CM

## 2024-09-24 DIAGNOSIS — J30.89 OTHER ALLERGIC RHINITIS: ICD-10-CM

## 2024-09-24 DIAGNOSIS — Z72.820 SLEEP DEPRIVATION: ICD-10-CM

## 2024-09-24 DIAGNOSIS — J45.40 MODERATE PERSISTENT ASTHMA, UNCOMPLICATED: ICD-10-CM

## 2024-09-24 PROCEDURE — 94618 PULMONARY STRESS TESTING: CPT

## 2024-09-24 PROCEDURE — ZZZZZ: CPT

## 2024-09-24 PROCEDURE — 94727 GAS DIL/WSHOT DETER LNG VOL: CPT

## 2024-09-24 PROCEDURE — 94729 DIFFUSING CAPACITY: CPT

## 2024-09-24 PROCEDURE — 94060 EVALUATION OF WHEEZING: CPT

## 2024-09-24 PROCEDURE — 99214 OFFICE O/P EST MOD 30 MIN: CPT | Mod: 25

## 2024-09-24 PROCEDURE — 95012 NITRIC OXIDE EXP GAS DETER: CPT

## 2024-09-24 PROCEDURE — 71046 X-RAY EXAM CHEST 2 VIEWS: CPT

## 2024-09-24 RX ORDER — DESLORATADINE 5 MG/1
5 TABLET ORAL
Qty: 90 | Refills: 1 | Status: ACTIVE | COMMUNITY
Start: 2024-09-24 | End: 1900-01-01

## 2024-09-24 RX ORDER — FAMOTIDINE 40 MG/1
40 TABLET, FILM COATED ORAL
Qty: 90 | Refills: 1 | Status: ACTIVE | COMMUNITY
Start: 2024-09-24 | End: 1900-01-01

## 2024-09-24 RX ORDER — ALBUTEROL SULFATE AND BUDESONIDE 90; 80 UG/1; UG/1
90-80 AEROSOL, METERED RESPIRATORY (INHALATION)
Qty: 1 | Refills: 3 | Status: ACTIVE | COMMUNITY
Start: 2024-09-24 | End: 1900-01-01

## 2024-09-24 NOTE — HISTORY OF PRESENT ILLNESS
[TextBox_4] : Ms. AMBROCIO is a 29 year old female with a history of premature, ADHD, thyroid CA (s/p thyroidectomy 2021) consequent hypothyroid, COVID-19 (2021, 2022), PNA (2022), asthma, allergies, abnormal CT (pulmonary nodules), vocal paralysis following thyroid surgery presenting to the office today for an initial pulmonary evaluation. Her chief complaint is  -she notes coughing a lot when sleeping previously -she notes tightness of chest related to cough, which she noticed when playing soccer -she notes weather and humidity affects her asthma Sx -she notes having seasonal allergies, dust and cats. Sx include: itchy eyes, rhinitis, and nasal congestion -she notes getting rashes as well with work -she notes some active Sx as a reaction to certain foods -she notes feeling fatigued s/p thyroidectomy -she notes some heartburn/reflux with certain foods -she notes s/p swallowing study -she notes frequent globus sensation -she notes some phlegm in her throat that she needs to clear -she notes post-thyroidectomy sleeping has been difficult -she denies snoring -she notes waking up due to nocturia -she notes weight is stable -she notes her memory and concentration could be better -she notes her menstrual cycle is regular -she notes some muscles cramps which she is taking magnesium for -she notes s/p visit to [place] and upon returning she felt chest pain and SOB; she notes being out of her inhaler and she was prescribed a steroid course -she notes seeing endocrinologist  -he denies any headaches, nausea, emesis, fever, chills, sweats, chest pain, chest pressure, wheezing, palpitations, diarrhea, constipation, dysphagia, vertigo, arthralgias, myalgias, leg swelling, itchy ears, or sour taste in the mouth.

## 2024-09-24 NOTE — ADDENDUM
[FreeTextEntry1] : Documented by José Manuel Vicente acting as a scribe for Dr. Zak Bassett on 09/24/2024. All medical record entries made by the Scribe were at my, Dr. Zak Bassett's, direction and personally dictated by me on 09/24/2024. I have reviewed the chart and agree that the record accurately reflects my personal performance of the history, physical exam, assessment and plan. I have also personally directed, reviewed, and agree with the discharge instructions.

## 2024-09-24 NOTE — REASON FOR VISIT
[Initial] : an initial visit [TextBox_44] : SOB, moderate persistent asthma, underlying seasonal/environmental/food allergies, LPR/GERD, ?OSMANY, abnormal chest CT (sub 3 mm pulmonary nodules)

## 2024-09-24 NOTE — ASSESSMENT
[FreeTextEntry1] : Ms. AMBROCIO is a 29 year old female with a history of premature, ADHD, thyroid CA (s/p thyroidectomy 2021) consequent hypothyroid, COVID-19 (2021, 2022), PNA (2022), asthma, allergies, abnormal CT (pulmonary nodules), vocal paralysis following thyroid surgery who now comes to the office for an initial pulmonary evaluation for SOB, moderate persistent asthma, underlying seasonal/environmental/food allergies, LPR/GERD, ?OSMANY, abnormal chest CT (sub 3 mm pulmonary nodules)    Her shortness of breath is multifactorial due to: -poor mechanics of breathing -out of shape -overweight -anxiety -pulmonary disease   -moderate persistent asthma -cardiac disease    -doubtful     Problem 1: moderate persistent asthma -continue Dulera -add Airsupra 2 inhalations Q6H PRN -Asthma is believed to be caused by inherited (genetic) and environmental factor, but its exact cause is unknown. Asthma may be triggered by allergens, lung infections, or irritants in the air. Asthma triggers are different for each person. -Inhaler technique reviewed as well as oral hygiene techniques reviewed with patient. Avoidance of cold air, extremes of temperature, rescue inhaler should be used before exercise. Order of medication reviewed with patient. Recommended adequate hydration and use of a cool mist humidifier in the bedroom  Problem 2: allergies/sinus (seasonal, environmental, food) -add Clarinex 5 mg QHS -continue Ryaltris 1 sniff BID, PRN -complete blood work: asthma panel, food IgE panel, IgE level, eosinophil level, vitamin D level -Environmental measures for allergies were encouraged including mattress and pillow covers, air purifier, and environmental controls.  Problem 3: LPR / GERD -add Pepcid 40 mg QHS -Rule of 2s: avoid eating too much, eating too late, eating too spicy, eating two hours before bed. -Things to avoid including overeating, spicy foods, tight clothing, eating within three hours of bed, this list is not all inclusive. -For treatment of reflux, possible options discussed including diet control, H2 blockers, PPIs, as well as coating motility agents discussed as treatment options. Timing of meals and proximity of last meal to sleep were discussed. If symptoms persist, a formal gastrointestinal evaluation is needed.  Problem 4: ?OSMANY (elevated Mallampati class, poor quality sleep, snoring) -complete home sleep study -recommended Epsom Salt bath 15 minutes qHS -Sleep apnea is associated with adverse clinical consequences which can affect most organ systems. Cardiovascular disease risk includes arrhythmias, atrial fibrillation, hypertension, coronary artery disease, and stroke. Metabolic disorders include diabetes type 2, non-alcoholic fatty liver disease. Mood disorder especially depression; and cognitive decline especially in the elderly. Associations with chronic reflux/Kaur's esophagus some but not all inclusive. -Reasons include arousal consistent with hypopnea; respiratory events most prominent in REM sleep or supine position; therefore sleep staging and body position are important for accurate diagnosis and estimation of AHI.  Problem 5: abnormal CT c/w inflammatory -follow-up LDCT  Problem 6: cardiac disease -recommended to continue to follow up with Cardiologist ([place])  Problem 7: poor breathing mechanics -Recommended Kathi Alba breathing technique -Proper breathing techniques were reviewed with an emphasis on exhalation. Patient was instructed to breathe in for 1 second and out for 4 seconds. The patient was encouraged to not talk while walking.  Problem 8: overweight/ out of shape -Weight loss, exercise, and diet control were discussed and are highly encouraged. Treatment options are given such as aqua therapy, and contacting a nutritionist. Recommended to use the elliptical, stationary bike, less use of the treadmill.  Problem 9: health maintenance -recommended yearly flu shot after October 15, 2023 -recommended strep pneumonia vaccines: Prevnar-20 vaccine -recommended early intervention for Upper Respiratory Infections (URIs) -recommended regular osteoporosis evaluations -recommended early dermatological evaluations -recommended after the age of 50 to the age of 70, colonoscopy every 5 years       F/P in 6-8 weeks. She is encouraged to call with any changes, concerns, or questions

## 2024-09-24 NOTE — PROCEDURE
[FreeTextEntry1] : CXR (09/24/2024) reveals a normal sized heart, s/p neck surgery, ?anterior upper lung scarring on lateral film; no evidence of infiltrate or effusion  Full PFT reveals mild obstructive dysfunction at mid to low; FEV1 was 2.39 L which is 92% of predicted, with 22% change on BD; normal lung volumes; normal diffusion at 14.79, which is 79% of predicted; normal flow volume loop.  PFTs were performed to evaluate for SOB  6-minute walk test reveals a low saturation of 88% with no evidence of dyspnea or fatigue; walked 489 meters  FENO was 6; a normal value being less than 25 Fractional exhaled nitric oxide (FENO) is regarded as a simple, noninvasive method for assessing eosinophilic airway inflammation. Produced by a variety of cells within the lung, nitric oxide (NO) concentrations are generally low in healthy individuals. However, high concentrations of NO appear to be involved in nonspecific host defense mechanisms and chronic inflammatory diseases such as asthma. The American Thoracic Society (ATS) therefore has recommended using FENO to aid in the diagnosis and monitoring of eosinophilic airway inflammation and asthma, and for identifying steroid responsive individuals whose chronic respiratory symptoms may be caused by airway inflammation.

## 2024-09-27 RX ORDER — AZELASTINE HYDROCHLORIDE AND FLUTICASONE PROPIONATE 137; 50 UG/1; UG/1
137-50 SPRAY, METERED NASAL
Qty: 3 | Refills: 5 | Status: ACTIVE | COMMUNITY
Start: 2024-09-27 | End: 1900-01-01

## 2024-09-30 RX ORDER — OLOPATADINE HYDROCHLORIDE AND MOMETASONE FUROATE 25; 665 UG/1; UG/1
665-25 SPRAY, METERED NASAL
Qty: 1 | Refills: 3 | Status: DISCONTINUED | COMMUNITY
Start: 2024-09-24 | End: 2024-09-30

## 2024-10-03 ENCOUNTER — APPOINTMENT (OUTPATIENT)
Dept: CT IMAGING | Facility: CLINIC | Age: 29
End: 2024-10-03

## 2024-10-03 PROCEDURE — 71250 CT THORAX DX C-: CPT | Mod: 26

## 2024-10-21 ENCOUNTER — OUTPATIENT (OUTPATIENT)
Dept: OUTPATIENT SERVICES | Facility: HOSPITAL | Age: 29
LOS: 1 days | End: 2024-10-21
Payer: COMMERCIAL

## 2024-10-21 ENCOUNTER — APPOINTMENT (OUTPATIENT)
Dept: SLEEP CENTER | Facility: CLINIC | Age: 29
End: 2024-10-21
Payer: COMMERCIAL

## 2024-10-21 DIAGNOSIS — E89.0 POSTPROCEDURAL HYPOTHYROIDISM: Chronic | ICD-10-CM

## 2024-10-21 PROCEDURE — 95800 SLP STDY UNATTENDED: CPT

## 2024-10-21 PROCEDURE — 95800 SLP STDY UNATTENDED: CPT | Mod: 26

## 2024-10-25 ENCOUNTER — TRANSCRIPTION ENCOUNTER (OUTPATIENT)
Age: 29
End: 2024-10-25

## 2024-10-25 DIAGNOSIS — G47.33 OBSTRUCTIVE SLEEP APNEA (ADULT) (PEDIATRIC): ICD-10-CM

## 2024-11-18 ENCOUNTER — APPOINTMENT (OUTPATIENT)
Dept: FAMILY MEDICINE | Facility: CLINIC | Age: 29
End: 2024-11-18

## 2024-11-20 ENCOUNTER — NON-APPOINTMENT (OUTPATIENT)
Age: 29
End: 2024-11-20

## 2024-11-25 ENCOUNTER — APPOINTMENT (OUTPATIENT)
Dept: PULMONOLOGY | Facility: CLINIC | Age: 29
End: 2024-11-25

## 2025-01-08 ENCOUNTER — APPOINTMENT (OUTPATIENT)
Dept: FAMILY MEDICINE | Facility: CLINIC | Age: 30
End: 2025-01-08
Payer: COMMERCIAL

## 2025-01-08 VITALS
SYSTOLIC BLOOD PRESSURE: 118 MMHG | WEIGHT: 124 LBS | OXYGEN SATURATION: 98 % | HEART RATE: 86 BPM | DIASTOLIC BLOOD PRESSURE: 68 MMHG | RESPIRATION RATE: 16 BRPM | HEIGHT: 58 IN | BODY MASS INDEX: 26.03 KG/M2 | TEMPERATURE: 96.4 F

## 2025-01-08 DIAGNOSIS — J45.909 UNSPECIFIED ASTHMA, UNCOMPLICATED: ICD-10-CM

## 2025-01-08 DIAGNOSIS — R53.83 OTHER FATIGUE: ICD-10-CM

## 2025-01-08 DIAGNOSIS — Z86.16 PERSONAL HISTORY OF COVID-19: ICD-10-CM

## 2025-01-08 DIAGNOSIS — J34.89 OTHER SPECIFIED DISORDERS OF NOSE AND NASAL SINUSES: ICD-10-CM

## 2025-01-08 DIAGNOSIS — C73 MALIGNANT NEOPLASM OF THYROID GLAND: ICD-10-CM

## 2025-01-08 DIAGNOSIS — R41.89 OTHER SYMPTOMS AND SIGNS INVOLVING COGNITIVE FUNCTIONS AND AWARENESS: ICD-10-CM

## 2025-01-08 DIAGNOSIS — R53.83 OTHER MALAISE: ICD-10-CM

## 2025-01-08 DIAGNOSIS — R53.81 OTHER MALAISE: ICD-10-CM

## 2025-01-08 PROCEDURE — G2211 COMPLEX E/M VISIT ADD ON: CPT

## 2025-01-08 PROCEDURE — 99214 OFFICE O/P EST MOD 30 MIN: CPT

## 2025-01-29 ENCOUNTER — APPOINTMENT (OUTPATIENT)
Dept: PULMONOLOGY | Facility: CLINIC | Age: 30
End: 2025-01-29

## 2025-01-30 ENCOUNTER — APPOINTMENT (OUTPATIENT)
Dept: FAMILY MEDICINE | Facility: CLINIC | Age: 30
End: 2025-01-30
Payer: COMMERCIAL

## 2025-01-30 VITALS
WEIGHT: 124 LBS | OXYGEN SATURATION: 96 % | SYSTOLIC BLOOD PRESSURE: 120 MMHG | TEMPERATURE: 97.3 F | HEART RATE: 91 BPM | RESPIRATION RATE: 16 BRPM | DIASTOLIC BLOOD PRESSURE: 74 MMHG | HEIGHT: 58 IN | BODY MASS INDEX: 26.03 KG/M2

## 2025-01-30 DIAGNOSIS — K21.9 GASTRO-ESOPHAGEAL REFLUX DISEASE W/OUT ESOPHAGITIS: ICD-10-CM

## 2025-01-30 DIAGNOSIS — J45.40 MODERATE PERSISTENT ASTHMA, UNCOMPLICATED: ICD-10-CM

## 2025-01-30 DIAGNOSIS — R53.83 OTHER FATIGUE: ICD-10-CM

## 2025-01-30 DIAGNOSIS — J38.01 PARALYSIS OF VOCAL CORDS AND LARYNX, UNILATERAL: ICD-10-CM

## 2025-01-30 DIAGNOSIS — J30.89 OTHER ALLERGIC RHINITIS: ICD-10-CM

## 2025-01-30 DIAGNOSIS — J45.909 UNSPECIFIED ASTHMA, UNCOMPLICATED: ICD-10-CM

## 2025-01-30 DIAGNOSIS — Z00.00 ENCOUNTER FOR GENERAL ADULT MEDICAL EXAMINATION W/OUT ABNORMAL FINDINGS: ICD-10-CM

## 2025-01-30 DIAGNOSIS — F90.9 ATTENTION-DEFICIT HYPERACTIVITY DISORDER, UNSPECIFIED TYPE: ICD-10-CM

## 2025-01-30 DIAGNOSIS — Z85.850 PERSONAL HISTORY OF MALIGNANT NEOPLASM OF THYROID: ICD-10-CM

## 2025-01-30 DIAGNOSIS — Z80.8 FAMILY HISTORY OF MALIGNANT NEOPLASM OF OTHER ORGANS OR SYSTEMS: ICD-10-CM

## 2025-01-30 PROCEDURE — 99395 PREV VISIT EST AGE 18-39: CPT

## 2025-02-25 ENCOUNTER — APPOINTMENT (OUTPATIENT)
Dept: FAMILY MEDICINE | Facility: CLINIC | Age: 30
End: 2025-02-25
Payer: COMMERCIAL

## 2025-02-25 VITALS
OXYGEN SATURATION: 98 % | HEIGHT: 58 IN | BODY MASS INDEX: 26.24 KG/M2 | DIASTOLIC BLOOD PRESSURE: 66 MMHG | RESPIRATION RATE: 16 BRPM | HEART RATE: 89 BPM | WEIGHT: 125 LBS | SYSTOLIC BLOOD PRESSURE: 122 MMHG | TEMPERATURE: 98.6 F

## 2025-02-25 DIAGNOSIS — R53.81 OTHER MALAISE: ICD-10-CM

## 2025-02-25 DIAGNOSIS — J45.909 UNSPECIFIED ASTHMA, UNCOMPLICATED: ICD-10-CM

## 2025-02-25 DIAGNOSIS — J45.40 MODERATE PERSISTENT ASTHMA, UNCOMPLICATED: ICD-10-CM

## 2025-02-25 DIAGNOSIS — R53.83 OTHER MALAISE: ICD-10-CM

## 2025-02-25 DIAGNOSIS — C73 MALIGNANT NEOPLASM OF THYROID GLAND: ICD-10-CM

## 2025-02-25 PROCEDURE — 99214 OFFICE O/P EST MOD 30 MIN: CPT

## 2025-02-25 PROCEDURE — G2211 COMPLEX E/M VISIT ADD ON: CPT

## 2025-03-20 ENCOUNTER — APPOINTMENT (OUTPATIENT)
Dept: NEUROSURGERY | Facility: CLINIC | Age: 30
End: 2025-03-20
Payer: COMMERCIAL

## 2025-03-20 ENCOUNTER — NON-APPOINTMENT (OUTPATIENT)
Age: 30
End: 2025-03-20

## 2025-03-20 VITALS
WEIGHT: 125 LBS | OXYGEN SATURATION: 100 % | DIASTOLIC BLOOD PRESSURE: 73 MMHG | HEIGHT: 58 IN | SYSTOLIC BLOOD PRESSURE: 131 MMHG | HEART RATE: 83 BPM | BODY MASS INDEX: 26.24 KG/M2

## 2025-03-20 DIAGNOSIS — H93.A9 PULSATILE TINNITUS, UNSPECIFIED EAR: ICD-10-CM

## 2025-03-20 PROCEDURE — 99203 OFFICE O/P NEW LOW 30 MIN: CPT

## 2025-04-11 ENCOUNTER — APPOINTMENT (OUTPATIENT)
Dept: MRI IMAGING | Facility: CLINIC | Age: 30
End: 2025-04-11

## 2025-04-11 ENCOUNTER — OUTPATIENT (OUTPATIENT)
Dept: OUTPATIENT SERVICES | Facility: HOSPITAL | Age: 30
LOS: 1 days | End: 2025-04-11
Payer: COMMERCIAL

## 2025-04-11 DIAGNOSIS — H93.A9 PULSATILE TINNITUS, UNSPECIFIED EAR: ICD-10-CM

## 2025-04-11 DIAGNOSIS — Z00.00 ENCOUNTER FOR GENERAL ADULT MEDICAL EXAMINATION WITHOUT ABNORMAL FINDINGS: ICD-10-CM

## 2025-04-11 DIAGNOSIS — E89.0 POSTPROCEDURAL HYPOTHYROIDISM: Chronic | ICD-10-CM

## 2025-04-11 PROCEDURE — A9585: CPT

## 2025-04-11 PROCEDURE — 70546 MR ANGIOGRAPH HEAD W/O&W/DYE: CPT | Mod: 26

## 2025-04-11 PROCEDURE — 70546 MR ANGIOGRAPH HEAD W/O&W/DYE: CPT

## 2025-04-15 ENCOUNTER — NON-APPOINTMENT (OUTPATIENT)
Age: 30
End: 2025-04-15

## 2025-04-17 ENCOUNTER — APPOINTMENT (OUTPATIENT)
Dept: ORTHOPEDIC SURGERY | Facility: CLINIC | Age: 30
End: 2025-04-17
Payer: OTHER MISCELLANEOUS

## 2025-04-17 DIAGNOSIS — M62.838 OTHER MUSCLE SPASM: ICD-10-CM

## 2025-04-17 PROCEDURE — 99203 OFFICE O/P NEW LOW 30 MIN: CPT

## 2025-04-22 ENCOUNTER — APPOINTMENT (OUTPATIENT)
Age: 30
End: 2025-04-22

## 2025-05-05 ENCOUNTER — APPOINTMENT (OUTPATIENT)
Dept: ORTHOPEDIC SURGERY | Facility: CLINIC | Age: 30
End: 2025-05-05
Payer: OTHER MISCELLANEOUS

## 2025-05-05 VITALS — WEIGHT: 125 LBS | BODY MASS INDEX: 26.24 KG/M2 | HEIGHT: 58 IN

## 2025-05-05 DIAGNOSIS — M54.12 RADICULOPATHY, CERVICAL REGION: ICD-10-CM

## 2025-05-05 PROCEDURE — 99213 OFFICE O/P EST LOW 20 MIN: CPT

## 2025-05-13 ENCOUNTER — APPOINTMENT (OUTPATIENT)
Dept: MRI IMAGING | Facility: CLINIC | Age: 30
End: 2025-05-13

## 2025-05-16 ENCOUNTER — APPOINTMENT (OUTPATIENT)
Dept: ORTHOPEDIC SURGERY | Facility: CLINIC | Age: 30
End: 2025-05-16
Payer: OTHER MISCELLANEOUS

## 2025-05-16 ENCOUNTER — APPOINTMENT (OUTPATIENT)
Dept: PULMONOLOGY | Facility: CLINIC | Age: 30
End: 2025-05-16
Payer: COMMERCIAL

## 2025-05-16 VITALS
DIASTOLIC BLOOD PRESSURE: 84 MMHG | OXYGEN SATURATION: 98 % | WEIGHT: 125 LBS | HEART RATE: 82 BPM | HEIGHT: 58 IN | BODY MASS INDEX: 26.24 KG/M2 | RESPIRATION RATE: 16 BRPM | SYSTOLIC BLOOD PRESSURE: 130 MMHG | TEMPERATURE: 97.9 F

## 2025-05-16 DIAGNOSIS — J45.40 MODERATE PERSISTENT ASTHMA, UNCOMPLICATED: ICD-10-CM

## 2025-05-16 DIAGNOSIS — Z72.820 SLEEP DEPRIVATION: ICD-10-CM

## 2025-05-16 DIAGNOSIS — J45.909 UNSPECIFIED ASTHMA, UNCOMPLICATED: ICD-10-CM

## 2025-05-16 DIAGNOSIS — M62.838 OTHER MUSCLE SPASM: ICD-10-CM

## 2025-05-16 DIAGNOSIS — R06.02 SHORTNESS OF BREATH: ICD-10-CM

## 2025-05-16 PROCEDURE — 94727 GAS DIL/WSHOT DETER LNG VOL: CPT

## 2025-05-16 PROCEDURE — 94010 BREATHING CAPACITY TEST: CPT

## 2025-05-16 PROCEDURE — 99214 OFFICE O/P EST MOD 30 MIN: CPT | Mod: 25

## 2025-05-16 PROCEDURE — 99213 OFFICE O/P EST LOW 20 MIN: CPT

## 2025-05-16 PROCEDURE — 94729 DIFFUSING CAPACITY: CPT

## 2025-05-29 ENCOUNTER — APPOINTMENT (OUTPATIENT)
Dept: NEUROSURGERY | Facility: CLINIC | Age: 30
End: 2025-05-29
Payer: OTHER MISCELLANEOUS

## 2025-05-29 ENCOUNTER — APPOINTMENT (OUTPATIENT)
Dept: ORTHOPEDIC SURGERY | Facility: CLINIC | Age: 30
End: 2025-05-29
Payer: OTHER MISCELLANEOUS

## 2025-05-29 DIAGNOSIS — M25.812 OTHER SPECIFIED JOINT DISORDERS, LEFT SHOULDER: ICD-10-CM

## 2025-05-29 DIAGNOSIS — M25.512 PAIN IN LEFT SHOULDER: ICD-10-CM

## 2025-05-29 DIAGNOSIS — H93.A9 PULSATILE TINNITUS, UNSPECIFIED EAR: ICD-10-CM

## 2025-05-29 PROCEDURE — 99213 OFFICE O/P EST LOW 20 MIN: CPT

## 2025-05-29 PROCEDURE — 99213 OFFICE O/P EST LOW 20 MIN: CPT | Mod: 95

## 2025-06-06 ENCOUNTER — APPOINTMENT (OUTPATIENT)
Dept: MRI IMAGING | Facility: CLINIC | Age: 30
End: 2025-06-06

## 2025-06-12 ENCOUNTER — APPOINTMENT (OUTPATIENT)
Dept: ORTHOPEDIC SURGERY | Facility: CLINIC | Age: 30
End: 2025-06-12
Payer: OTHER MISCELLANEOUS

## 2025-06-12 PROBLEM — M62.838 CERVICAL PARASPINAL MUSCLE SPASM: Status: RESOLVED | Noted: 2025-04-17 | Resolved: 2025-06-12

## 2025-06-12 PROCEDURE — 99213 OFFICE O/P EST LOW 20 MIN: CPT

## 2025-06-20 ENCOUNTER — OUTPATIENT (OUTPATIENT)
Dept: OUTPATIENT SERVICES | Facility: HOSPITAL | Age: 30
LOS: 1 days | End: 2025-06-20
Payer: COMMERCIAL

## 2025-06-20 ENCOUNTER — APPOINTMENT (OUTPATIENT)
Dept: MRI IMAGING | Facility: CLINIC | Age: 30
End: 2025-06-20

## 2025-06-20 DIAGNOSIS — R93.89 ABNORMAL FINDINGS ON DIAGNOSTIC IMAGING OF OTHER SPECIFIED BODY STRUCTURES: ICD-10-CM

## 2025-06-20 DIAGNOSIS — E89.0 POSTPROCEDURAL HYPOTHYROIDISM: Chronic | ICD-10-CM

## 2025-06-20 DIAGNOSIS — M25.812 OTHER SPECIFIED JOINT DISORDERS, LEFT SHOULDER: ICD-10-CM

## 2025-06-20 PROCEDURE — 73221 MRI JOINT UPR EXTREM W/O DYE: CPT

## 2025-06-20 PROCEDURE — 73221 MRI JOINT UPR EXTREM W/O DYE: CPT | Mod: 26,LT

## 2025-06-24 ENCOUNTER — APPOINTMENT (OUTPATIENT)
Dept: NEUROSURGERY | Facility: CLINIC | Age: 30
End: 2025-06-24
Payer: COMMERCIAL

## 2025-06-24 VITALS
HEART RATE: 117 BPM | OXYGEN SATURATION: 100 % | RESPIRATION RATE: 16 BRPM | SYSTOLIC BLOOD PRESSURE: 132 MMHG | DIASTOLIC BLOOD PRESSURE: 80 MMHG

## 2025-06-24 PROCEDURE — 99213 OFFICE O/P EST LOW 20 MIN: CPT

## 2025-06-25 RX ORDER — LEVOTHYROXINE SODIUM 100 UG/1
100 CAPSULE ORAL
Refills: 0 | Status: ACTIVE | COMMUNITY

## 2025-06-26 ENCOUNTER — APPOINTMENT (OUTPATIENT)
Dept: ORTHOPEDIC SURGERY | Facility: CLINIC | Age: 30
End: 2025-06-26
Payer: OTHER MISCELLANEOUS

## 2025-06-26 PROCEDURE — 99214 OFFICE O/P EST MOD 30 MIN: CPT | Mod: 25

## 2025-06-26 PROCEDURE — 20610 DRAIN/INJ JOINT/BURSA W/O US: CPT | Mod: LT

## 2025-07-10 ENCOUNTER — APPOINTMENT (OUTPATIENT)
Dept: ORTHOPEDIC SURGERY | Facility: CLINIC | Age: 30
End: 2025-07-10
Payer: OTHER MISCELLANEOUS

## 2025-07-10 PROCEDURE — 99213 OFFICE O/P EST LOW 20 MIN: CPT

## 2025-07-14 ENCOUNTER — NON-APPOINTMENT (OUTPATIENT)
Age: 30
End: 2025-07-14

## 2025-07-25 ENCOUNTER — APPOINTMENT (OUTPATIENT)
Dept: FAMILY MEDICINE | Facility: CLINIC | Age: 30
End: 2025-07-25

## 2025-07-25 VITALS
TEMPERATURE: 99.1 F | WEIGHT: 122 LBS | OXYGEN SATURATION: 98 % | HEART RATE: 100 BPM | BODY MASS INDEX: 25.61 KG/M2 | DIASTOLIC BLOOD PRESSURE: 80 MMHG | SYSTOLIC BLOOD PRESSURE: 128 MMHG | RESPIRATION RATE: 16 BRPM | HEIGHT: 58 IN

## 2025-07-25 DIAGNOSIS — R53.83 OTHER MALAISE: ICD-10-CM

## 2025-07-25 DIAGNOSIS — R19.7 DIARRHEA, UNSPECIFIED: ICD-10-CM

## 2025-07-25 DIAGNOSIS — R53.81 OTHER MALAISE: ICD-10-CM

## 2025-07-25 DIAGNOSIS — M25.50 PAIN IN UNSPECIFIED JOINT: ICD-10-CM

## 2025-07-25 PROCEDURE — 99214 OFFICE O/P EST MOD 30 MIN: CPT

## 2025-07-25 PROCEDURE — G2211 COMPLEX E/M VISIT ADD ON: CPT

## 2025-07-28 ENCOUNTER — LABORATORY RESULT (OUTPATIENT)
Age: 30
End: 2025-07-28

## 2025-07-29 LAB
RHEUMATOID FACT SERPL-ACNC: <10 IU/ML
SARS-COV-2 N GENE NPH QL NAA+PROBE: NOT DETECTED

## 2025-07-29 RX ORDER — ASPIRIN 325 MG/1
325 TABLET, FILM COATED ORAL DAILY
Qty: 90 | Refills: 3 | Status: ACTIVE | COMMUNITY
Start: 2025-07-29 | End: 1900-01-01

## 2025-07-29 RX ORDER — CLOPIDOGREL BISULFATE 75 MG/1
75 TABLET, FILM COATED ORAL DAILY
Qty: 40 | Refills: 0 | Status: ACTIVE | COMMUNITY
Start: 2025-07-29 | End: 1900-01-01

## 2025-07-30 LAB — GI PCR PANEL: NOT DETECTED

## 2025-07-31 LAB
ANA TITR SER: NEGATIVE
BACTERIA STL CULT: NORMAL

## 2025-08-01 LAB — DEPRECATED O AND P PREP STL: NORMAL

## 2025-08-15 ENCOUNTER — OUTPATIENT (OUTPATIENT)
Dept: OUTPATIENT SERVICES | Facility: HOSPITAL | Age: 30
LOS: 1 days | End: 2025-08-15
Payer: COMMERCIAL

## 2025-08-15 VITALS
RESPIRATION RATE: 16 BRPM | DIASTOLIC BLOOD PRESSURE: 78 MMHG | OXYGEN SATURATION: 99 % | HEIGHT: 58 IN | SYSTOLIC BLOOD PRESSURE: 108 MMHG | HEART RATE: 99 BPM | TEMPERATURE: 98 F | WEIGHT: 121.92 LBS

## 2025-08-15 DIAGNOSIS — Z98.890 OTHER SPECIFIED POSTPROCEDURAL STATES: Chronic | ICD-10-CM

## 2025-08-15 DIAGNOSIS — H93.A9 PULSATILE TINNITUS, UNSPECIFIED EAR: ICD-10-CM

## 2025-08-15 DIAGNOSIS — E89.0 POSTPROCEDURAL HYPOTHYROIDISM: Chronic | ICD-10-CM

## 2025-08-15 PROBLEM — C73 MALIGNANT NEOPLASM OF THYROID GLAND: Chronic | Status: INACTIVE | Noted: 2022-03-17 | Resolved: 2025-08-15

## 2025-08-15 LAB
ANION GAP SERPL CALC-SCNC: 16 MMOL/L — SIGNIFICANT CHANGE UP (ref 5–17)
APTT BLD: 27.4 SEC — SIGNIFICANT CHANGE UP (ref 26.1–36.8)
BLD GP AB SCN SERPL QL: NEGATIVE — SIGNIFICANT CHANGE UP
BUN SERPL-MCNC: 13 MG/DL — SIGNIFICANT CHANGE UP (ref 7–23)
CALCIUM SERPL-MCNC: 9.9 MG/DL — SIGNIFICANT CHANGE UP (ref 8.4–10.5)
CHLORIDE SERPL-SCNC: 99 MMOL/L — SIGNIFICANT CHANGE UP (ref 96–108)
CO2 SERPL-SCNC: 22 MMOL/L — SIGNIFICANT CHANGE UP (ref 22–31)
CREAT SERPL-MCNC: 0.67 MG/DL — SIGNIFICANT CHANGE UP (ref 0.5–1.3)
EGFR: 120 ML/MIN/1.73M2 — SIGNIFICANT CHANGE UP
EGFR: 120 ML/MIN/1.73M2 — SIGNIFICANT CHANGE UP
GLUCOSE SERPL-MCNC: 83 MG/DL — SIGNIFICANT CHANGE UP (ref 70–99)
HCT VFR BLD CALC: 41.2 % — SIGNIFICANT CHANGE UP (ref 34.5–45)
HGB BLD-MCNC: 12.9 G/DL — SIGNIFICANT CHANGE UP (ref 11.5–15.5)
INR BLD: 0.89 RATIO — SIGNIFICANT CHANGE UP (ref 0.85–1.16)
MCHC RBC-ENTMCNC: 24.8 PG — LOW (ref 27–34)
MCHC RBC-ENTMCNC: 31.3 G/DL — LOW (ref 32–36)
MCV RBC AUTO: 79.2 FL — LOW (ref 80–100)
NRBC # BLD AUTO: 0 K/UL — SIGNIFICANT CHANGE UP (ref 0–0)
NRBC # FLD: 0 K/UL — SIGNIFICANT CHANGE UP (ref 0–0)
NRBC BLD AUTO-RTO: 0 /100 WBCS — SIGNIFICANT CHANGE UP (ref 0–0)
PLATELET # BLD AUTO: 440 K/UL — HIGH (ref 150–400)
PMV BLD: 10.3 FL — SIGNIFICANT CHANGE UP (ref 7–13)
POTASSIUM SERPL-MCNC: 4 MMOL/L — SIGNIFICANT CHANGE UP (ref 3.5–5.3)
POTASSIUM SERPL-SCNC: 4 MMOL/L — SIGNIFICANT CHANGE UP (ref 3.5–5.3)
PROTHROM AB SERPL-ACNC: 10.3 SEC — SIGNIFICANT CHANGE UP (ref 9.9–13.4)
RBC # BLD: 5.2 M/UL — SIGNIFICANT CHANGE UP (ref 3.8–5.2)
RBC # FLD: 13.4 % — SIGNIFICANT CHANGE UP (ref 10.3–14.5)
RH IG SCN BLD-IMP: POSITIVE — SIGNIFICANT CHANGE UP
SODIUM SERPL-SCNC: 137 MMOL/L — SIGNIFICANT CHANGE UP (ref 135–145)
WBC # BLD: 9.08 K/UL — SIGNIFICANT CHANGE UP (ref 3.8–10.5)
WBC # FLD AUTO: 9.08 K/UL — SIGNIFICANT CHANGE UP (ref 3.8–10.5)

## 2025-08-15 PROCEDURE — 80048 BASIC METABOLIC PNL TOTAL CA: CPT

## 2025-08-15 PROCEDURE — 86901 BLOOD TYPING SEROLOGIC RH(D): CPT

## 2025-08-15 PROCEDURE — 36415 COLL VENOUS BLD VENIPUNCTURE: CPT

## 2025-08-15 PROCEDURE — 85730 THROMBOPLASTIN TIME PARTIAL: CPT

## 2025-08-15 PROCEDURE — 86850 RBC ANTIBODY SCREEN: CPT

## 2025-08-15 PROCEDURE — 86900 BLOOD TYPING SEROLOGIC ABO: CPT

## 2025-08-15 PROCEDURE — 85610 PROTHROMBIN TIME: CPT

## 2025-08-15 PROCEDURE — G0463: CPT

## 2025-08-15 PROCEDURE — 85027 COMPLETE CBC AUTOMATED: CPT

## 2025-08-15 RX ORDER — LEVOTHYROXINE SODIUM 300 MCG
1 TABLET ORAL
Refills: 0 | DISCHARGE

## 2025-08-15 RX ORDER — CLOPIDOGREL BISULFATE 75 MG/1
1 TABLET, FILM COATED ORAL
Refills: 0 | DISCHARGE

## 2025-08-15 RX ORDER — TRAZODONE HCL 100 MG
1 TABLET ORAL
Refills: 0 | DISCHARGE

## 2025-08-15 RX ORDER — ASPIRIN 325 MG
1 TABLET ORAL
Refills: 0 | DISCHARGE

## 2025-08-22 ENCOUNTER — APPOINTMENT (OUTPATIENT)
Dept: NEUROSURGERY | Facility: HOSPITAL | Age: 30
End: 2025-08-22

## (undated) DEVICE — TUBING IV SET GRAVITY 3Y 100" MACRO

## (undated) DEVICE — IRRIGATOR BIO SHIELD

## (undated) DEVICE — FOLEY HOLDER STATLOCK 2 WAY ADULT

## (undated) DEVICE — FORCEP RADIAL JAW 4 JUMBO 2.8MM 3.2MM 240CM ORANGE DISP

## (undated) DEVICE — BRUSH COLONOSCOPY CYTOLOGY

## (undated) DEVICE — TUBING SUCTION 20FT

## (undated) DEVICE — SYR LUER LOK 50CC

## (undated) DEVICE — CATH IV SAFE BC 22G X 1" (BLUE)

## (undated) DEVICE — SOL INJ NS 0.9% 500ML 2 PORT

## (undated) DEVICE — TUBING SUCTION CONN 6FT STERILE

## (undated) DEVICE — POLY TRAP ETRAP

## (undated) DEVICE — SUCTION YANKAUER NO CONTROL VENT

## (undated) DEVICE — ELCTR GROUNDING PAD ADULT COVIDIEN

## (undated) DEVICE — SENSOR O2 FINGER ADULT 24/CA

## (undated) DEVICE — CLAMP BX HOT RAD JAW 3

## (undated) DEVICE — BIOPSY FORCEP RADIAL JAW 4 STANDARD WITH NEEDLE

## (undated) DEVICE — PACK IV START WITH CHG

## (undated) DEVICE — CATH IV SAFE BC 20G X 1.16" (PINK)